# Patient Record
Sex: FEMALE | Race: WHITE | NOT HISPANIC OR LATINO | Employment: UNEMPLOYED | URBAN - METROPOLITAN AREA
[De-identification: names, ages, dates, MRNs, and addresses within clinical notes are randomized per-mention and may not be internally consistent; named-entity substitution may affect disease eponyms.]

---

## 2017-01-13 ENCOUNTER — ALLSCRIPTS OFFICE VISIT (OUTPATIENT)
Dept: OTHER | Facility: OTHER | Age: 52
End: 2017-01-13

## 2018-01-13 VITALS
HEART RATE: 70 BPM | HEIGHT: 63 IN | BODY MASS INDEX: 32.25 KG/M2 | SYSTOLIC BLOOD PRESSURE: 124 MMHG | DIASTOLIC BLOOD PRESSURE: 74 MMHG | WEIGHT: 182 LBS | RESPIRATION RATE: 16 BRPM

## 2018-02-14 DIAGNOSIS — G43.009 MIGRAINE WITHOUT AURA AND WITHOUT STATUS MIGRAINOSUS, NOT INTRACTABLE: Primary | ICD-10-CM

## 2018-02-14 RX ORDER — SUMATRIPTAN 100 MG/1
TABLET, FILM COATED ORAL
Qty: 9 TABLET | Refills: 5 | Status: SHIPPED | OUTPATIENT
Start: 2018-02-14 | End: 2019-03-15 | Stop reason: SDUPTHER

## 2018-02-14 RX ORDER — TOPIRAMATE 100 MG/1
TABLET, FILM COATED ORAL
Qty: 30 TABLET | Refills: 5 | Status: SHIPPED | OUTPATIENT
Start: 2018-02-14

## 2018-11-03 ENCOUNTER — HOSPITAL ENCOUNTER (EMERGENCY)
Facility: HOSPITAL | Age: 53
Discharge: HOME | End: 2018-11-04
Attending: EMERGENCY MEDICINE | Admitting: EMERGENCY MEDICINE
Payer: COMMERCIAL

## 2018-11-03 ENCOUNTER — APPOINTMENT (EMERGENCY)
Dept: RADIOLOGY | Facility: HOSPITAL | Age: 53
End: 2018-11-03
Payer: COMMERCIAL

## 2018-11-03 VITALS
OXYGEN SATURATION: 95 % | TEMPERATURE: 98.9 F | DIASTOLIC BLOOD PRESSURE: 67 MMHG | WEIGHT: 219.58 LBS | HEART RATE: 84 BPM | RESPIRATION RATE: 20 BRPM | SYSTOLIC BLOOD PRESSURE: 141 MMHG | BODY MASS INDEX: 38.9 KG/M2

## 2018-11-03 DIAGNOSIS — F41.9 ANXIETY: Primary | ICD-10-CM

## 2018-11-03 LAB
AMPHETAMINES SERPL QL SCN: NEGATIVE
ANION GAP SERPL CALCULATED.3IONS-SCNC: 9 MMOL/L (ref 4–13)
BARBITURATES UR QL: NEGATIVE
BASOPHILS # BLD AUTO: 0.04 THOUSANDS/ΜL (ref 0–0.1)
BASOPHILS NFR BLD AUTO: 0 % (ref 0–1)
BENZODIAZ UR QL: NEGATIVE
BUN SERPL-MCNC: 12 MG/DL (ref 5–25)
CALCIUM SERPL-MCNC: 9.4 MG/DL (ref 8.3–10.1)
CHLORIDE SERPL-SCNC: 106 MMOL/L (ref 100–108)
CO2 SERPL-SCNC: 26 MMOL/L (ref 21–32)
COCAINE UR QL: NEGATIVE
CREAT SERPL-MCNC: 0.7 MG/DL (ref 0.6–1.3)
EOSINOPHIL # BLD AUTO: 0.16 THOUSAND/ΜL (ref 0–0.61)
EOSINOPHIL NFR BLD AUTO: 2 % (ref 0–6)
ERYTHROCYTE [DISTWIDTH] IN BLOOD BY AUTOMATED COUNT: 11.9 % (ref 11.6–15.1)
ETHANOL EXG-MCNC: 0 MG/DL
GFR SERPL CREATININE-BSD FRML MDRD: 98 ML/MIN/1.73SQ M
GLUCOSE SERPL-MCNC: 110 MG/DL (ref 65–140)
HCT VFR BLD AUTO: 42.8 % (ref 34.8–46.1)
HGB BLD-MCNC: 14.3 G/DL (ref 11.5–15.4)
IMM GRANULOCYTES # BLD AUTO: 0.06 THOUSAND/UL (ref 0–0.2)
IMM GRANULOCYTES NFR BLD AUTO: 1 % (ref 0–2)
LYMPHOCYTES # BLD AUTO: 2.67 THOUSANDS/ΜL (ref 0.6–4.47)
LYMPHOCYTES NFR BLD AUTO: 27 % (ref 14–44)
MCH RBC QN AUTO: 33.2 PG (ref 26.8–34.3)
MCHC RBC AUTO-ENTMCNC: 33.4 G/DL (ref 31.4–37.4)
MCV RBC AUTO: 99 FL (ref 82–98)
METHADONE UR QL: NEGATIVE
MONOCYTES # BLD AUTO: 0.77 THOUSAND/ΜL (ref 0.17–1.22)
MONOCYTES NFR BLD AUTO: 8 % (ref 4–12)
NEUTROPHILS # BLD AUTO: 6.3 THOUSANDS/ΜL (ref 1.85–7.62)
NEUTS SEG NFR BLD AUTO: 62 % (ref 43–75)
NRBC BLD AUTO-RTO: 0 /100 WBCS
OPIATES UR QL SCN: NEGATIVE
PCP UR QL: NEGATIVE
PLATELET # BLD AUTO: 285 THOUSANDS/UL (ref 149–390)
PMV BLD AUTO: 10.3 FL (ref 8.9–12.7)
POTASSIUM SERPL-SCNC: 3.3 MMOL/L (ref 3.5–5.3)
RBC # BLD AUTO: 4.31 MILLION/UL (ref 3.81–5.12)
SODIUM SERPL-SCNC: 141 MMOL/L (ref 136–145)
THC UR QL: NEGATIVE
TROPONIN I SERPL-MCNC: <0.02 NG/ML
WBC # BLD AUTO: 10 THOUSAND/UL (ref 4.31–10.16)

## 2018-11-03 PROCEDURE — 85025 COMPLETE CBC W/AUTO DIFF WBC: CPT | Performed by: EMERGENCY MEDICINE

## 2018-11-03 PROCEDURE — 82075 ASSAY OF BREATH ETHANOL: CPT | Performed by: EMERGENCY MEDICINE

## 2018-11-03 PROCEDURE — 96374 THER/PROPH/DIAG INJ IV PUSH: CPT

## 2018-11-03 PROCEDURE — 71046 X-RAY EXAM CHEST 2 VIEWS: CPT

## 2018-11-03 PROCEDURE — 80048 BASIC METABOLIC PNL TOTAL CA: CPT | Performed by: EMERGENCY MEDICINE

## 2018-11-03 PROCEDURE — 36415 COLL VENOUS BLD VENIPUNCTURE: CPT | Performed by: EMERGENCY MEDICINE

## 2018-11-03 PROCEDURE — 99284 EMERGENCY DEPT VISIT MOD MDM: CPT

## 2018-11-03 PROCEDURE — 80307 DRUG TEST PRSMV CHEM ANLYZR: CPT | Performed by: EMERGENCY MEDICINE

## 2018-11-03 PROCEDURE — 84484 ASSAY OF TROPONIN QUANT: CPT | Performed by: EMERGENCY MEDICINE

## 2018-11-03 RX ORDER — 0.9 % SODIUM CHLORIDE 0.9 %
3 VIAL (ML) INJECTION AS NEEDED
Status: DISCONTINUED | OUTPATIENT
Start: 2018-11-03 | End: 2018-11-04 | Stop reason: HOSPADM

## 2018-11-03 RX ORDER — ALPRAZOLAM 0.5 MG/1
1 TABLET ORAL ONCE
Status: COMPLETED | OUTPATIENT
Start: 2018-11-03 | End: 2018-11-03

## 2018-11-03 RX ORDER — CLONAZEPAM 1 MG/1
1 TABLET ORAL 2 TIMES DAILY
COMMUNITY

## 2018-11-03 RX ORDER — LORAZEPAM 2 MG/ML
1 INJECTION INTRAMUSCULAR ONCE
Status: COMPLETED | OUTPATIENT
Start: 2018-11-03 | End: 2018-11-03

## 2018-11-03 RX ADMIN — ALPRAZOLAM 1 MG: 0.5 TABLET ORAL at 21:11

## 2018-11-03 RX ADMIN — LORAZEPAM 1 MG: 2 INJECTION INTRAMUSCULAR; INTRAVENOUS at 20:47

## 2018-11-04 NOTE — ED PROVIDER NOTES
History  Chief Complaint   Patient presents with    Anxiety     Pt states shes had anxiety for about an hour  She arrived by EMS  HPI   13-year-old female presents to the emergency department with anxiety and chest tightness  Patient is on willing to offer much history, but does admit to having been recently feeling very anxious  She takes Klonopin at home without significant relief  She denies any known trigger tonight for her anxiety  On review of systems, patient complains of chest tightness which she typically gets with anxiety  ROS otherwise negative  She denies any plan for suicide, though she does admit to having had passive thoughts of suicide for many years  She sees an outpatient therapist and does not believe she needs inpatient treatment at this time  Prior to Admission Medications   Prescriptions Last Dose Informant Patient Reported? Taking? SUMAtriptan (IMITREX) 100 mg tablet   No Yes   Sig: TAKE ONE TABLET BY MOUTH TWICE DAILY AS NEEDED   clonazePAM (KlonoPIN) 1 mg tablet   Yes Yes   Sig: Take 1 mg by mouth 2 (two) times a day   topiramate (TOPAMAX) 100 mg tablet   No Yes   Sig: TAKE ONE TABLET BY MOUTH ONCE DAILY AT BEDTIME      Facility-Administered Medications: None       Past Medical History:   Diagnosis Date    Psychiatric disorder        Past Surgical History:   Procedure Laterality Date     SECTION         History reviewed  No pertinent family history  I have reviewed and agree with the history as documented  Social History   Substance Use Topics    Smoking status: Light Tobacco Smoker    Smokeless tobacco: Never Used    Alcohol use No        Review of Systems   Constitutional: Negative for chills and fever  Respiratory: Negative for shortness of breath  Cardiovascular: Negative for chest pain (tightness only) and leg swelling  Gastrointestinal: Negative for abdominal pain, nausea and vomiting     Musculoskeletal: Negative for arthralgias and joint swelling  Skin: Negative for rash and wound  Allergic/Immunologic: Negative for immunocompromised state  Neurological: Negative for headaches  Psychiatric/Behavioral: The patient is nervous/anxious  All other systems reviewed and are negative  Physical Exam  Physical Exam   Constitutional: She is oriented to person, place, and time  She appears well-nourished  No distress  HENT:   Head: Normocephalic and atraumatic  Eyes: EOM are normal    Neck: Normal range of motion  Neck supple  Cardiovascular: Normal rate and regular rhythm  Pulmonary/Chest: Effort normal and breath sounds normal  No respiratory distress  Abdominal: Soft  She exhibits no distension  There is no tenderness  Musculoskeletal: Normal range of motion  Neurological: She is alert and oriented to person, place, and time  Skin: Skin is warm and dry  She is not diaphoretic  Psychiatric: Her mood appears anxious  She is withdrawn  She exhibits a depressed mood  She expresses no suicidal plans  Voice very soft, patient does not make eye contact on exam   Nursing note and vitals reviewed        Vital Signs  ED Triage Vitals [11/03/18 1852]   Temperature Pulse Respirations Blood Pressure SpO2   98 9 °F (37 2 °C) 96 20 (!) 176/82 100 %      Temp Source Heart Rate Source Patient Position - Orthostatic VS BP Location FiO2 (%)   Oral Monitor Lying Right arm --      Pain Score       --           Vitals:    11/03/18 1930 11/03/18 2000 11/03/18 2045 11/03/18 2245   BP: 155/78 140/68 164/79 141/67   Pulse: 96 85 55 84   Patient Position - Orthostatic VS:           Visual Acuity      ED Medications  Medications   LORazepam (ATIVAN) 2 mg/mL injection 1 mg (1 mg Intravenous Given 11/3/18 2047)   ALPRAZolam (XANAX) tablet 1 mg (1 mg Oral Given 11/3/18 2111)       Diagnostic Studies  Results Reviewed     Procedure Component Value Units Date/Time    POCT alcohol breath test [[id number]]  (Normal) Resulted:  11/03/18 2238    Lab Status: Final result Updated:  11/03/18 2238     EXTBreath Alcohol 0 00    Rapid drug screen, urine [[id number]]  (Normal) Collected:  11/03/18 2144    Lab Status:  Final result Specimen:  Urine from Urine, Clean Catch Updated:  11/03/18 2204     Amph/Meth UR Negative     Barbiturate Ur Negative     Benzodiazepine Urine Negative     Cocaine Urine Negative     Methadone Urine Negative     Opiate Urine Negative     PCP Ur Negative     THC Urine Negative    Narrative:         FOR MEDICAL PURPOSES ONLY  IF CONFIRMATION NEEDED PLEASE CONTACT THE LAB WITHIN 5 DAYS  Drug Screen Cutoff Levels:  AMPHETAMINE/METHAMPHETAMINES  1000 ng/mL  BARBITURATES     200 ng/mL  BENZODIAZEPINES     200 ng/mL  COCAINE      300 ng/mL  METHADONE      300 ng/mL  OPIATES      300 ng/mL  PHENCYCLIDINE     25 ng/mL  THC       50 ng/mL    Troponin I [[id number]]  (Normal) Collected:  11/03/18 2054    Lab Status:  Final result Specimen:  Blood from Arm, Left Updated:  11/03/18 2134     Troponin I <0 02 ng/mL     Basic metabolic panel [[id number]]  (Abnormal) Collected:  11/03/18 2054    Lab Status:  Final result Specimen:  Blood from Arm, Left Updated:  11/03/18 2125     Sodium 141 mmol/L      Potassium 3 3 (L) mmol/L      Chloride 106 mmol/L      CO2 26 mmol/L      ANION GAP 9 mmol/L      BUN 12 mg/dL      Creatinine 0 70 mg/dL      Glucose 110 mg/dL      Calcium 9 4 mg/dL      eGFR 98 ml/min/1 73sq m     Narrative:         National Kidney Disease Education Program recommendations are as follows:  GFR calculation is accurate only with a steady state creatinine  Chronic Kidney disease less than 60 ml/min/1 73 sq  meters  Kidney failure less than 15 ml/min/1 73 sq  meters      CBC and differential [[id number]]  (Abnormal) Collected:  11/03/18 2054    Lab Status:  Final result Specimen:  Blood from Arm, Left Updated:  11/03/18 2111     WBC 10 00 Thousand/uL      RBC 4 31 Million/uL      Hemoglobin 14 3 g/dL      Hematocrit 42 8 %      MCV 99 (H) fL      MCH 33 2 pg      MCHC 33 4 g/dL      RDW 11 9 %      MPV 10 3 fL      Platelets 515 Thousands/uL      nRBC 0 /100 WBCs      Neutrophils Relative 62 %      Immat GRANS % 1 %      Lymphocytes Relative 27 %      Monocytes Relative 8 %      Eosinophils Relative 2 %      Basophils Relative 0 %      Neutrophils Absolute 6 30 Thousands/µL      Immature Grans Absolute 0 06 Thousand/uL      Lymphocytes Absolute 2 67 Thousands/µL      Monocytes Absolute 0 77 Thousand/µL      Eosinophils Absolute 0 16 Thousand/µL      Basophils Absolute 0 04 Thousands/µL                  X-ray chest 2 views   Final Result by Tejinder Hitchcock MD (11/05 7093)      No acute cardiopulmonary disease  Workstation performed: [id number]                    Procedures  Procedures       Phone Contacts  ED Phone Contact    ED Course  ED Course as of Nov 10 0753   Sat Nov 03, 2018   2136 Medically cleared    2213 Pending crisis eval                                MDM  Number of Diagnoses or Management Options  Anxiety:   Diagnosis management comments: 28-year-old female with anxiety, chest tightness, likely related to anxiety  However, given patient's lack of history will obtain cardiac workup prior to crisis eval  Will give ativan and reassess  Dispo pending  No criteria for 302  CritCare Time    Disposition  Final diagnoses:   Anxiety     Time reflects when diagnosis was documented in both MDM as applicable and the Disposition within this note     Time User Action Codes Description Comment    11/3/2018 11:51 PM Kory Ray Add [F41 9] Anxiety       ED Disposition     ED Disposition Condition Comment    Discharge  Jona Carey discharge to home/self care      Condition at discharge: Good        MD Documentation      Most Recent Value   Sending MD Dr Kory Ray up With Specialties Details Why [facility] Emergency Medicine  As needed, If symptoms worsen [facility] [address]  [phone] [place] [facility], [address] [place] [address], [phone]          Discharge Medication List as of 11/3/2018 11:52 PM      CONTINUE these medications which have NOT CHANGED    Details   clonazePAM (KlonoPIN) 1 mg tablet Take 1 mg by mouth 2 (two) times a day, Historical Med      SUMAtriptan (IMITREX) 100 mg tablet TAKE ONE TABLET BY MOUTH TWICE DAILY AS NEEDED, Normal      topiramate (TOPAMAX) 100 mg tablet TAKE ONE TABLET BY MOUTH ONCE DAILY AT BEDTIME, Normal           No discharge procedures on file      ED Provider  Electronically Signed by           Wenceslao Rowe MD  11/10/18 0800

## 2018-11-04 NOTE — DISCHARGE INSTRUCTIONS
Anxiety   WHAT YOU NEED TO KNOW:   Anxiety is a condition that causes you to feel extremely worried or nervous  The feelings are so strong that they can cause problems with your daily activities or sleep  Anxiety may be triggered by something you fear, or it may happen without a cause  Family or work stress, smoking, caffeine, and alcohol can increase your risk for anxiety  Certain medicines or health conditions can also increase your risk  Anxiety can become a long-term condition if it is not managed or treated  DISCHARGE INSTRUCTIONS:   Call 911 if:   · You have chest pain, tightness, or heaviness that may spread to your shoulders, arms, jaw, neck, or back  · You feel like hurting yourself or someone else  Contact your healthcare provider if:   · Your symptoms get worse or do not get better with treatment  · Your anxiety keeps you from doing your regular daily activities  · You have new symptoms since your last visit  · You have questions or concerns about your condition or care  Medicines:   · Medicines  may be given to help you feel more calm and relaxed, and decrease your symptoms  · Take your medicine as directed  Contact your healthcare provider if you think your medicine is not helping or if you have side effects  Tell him of her if you are allergic to any medicine  Keep a list of the medicines, vitamins, and herbs you take  Include the amounts, and when and why you take them  Bring the list or the pill bottles to follow-up visits  Carry your medicine list with you in case of an emergency  Follow up with your healthcare provider within 2 weeks or as directed:  Write down your questions so you remember to ask them during your visits  Manage anxiety:   · Talk to someone about your anxiety  Your healthcare provider may suggest counseling  Cognitive behavioral therapy can help you understand and change how you react to events that trigger your symptoms   You might feel more comfortable talking with a friend or family member about your anxiety  Choose someone you know will be supportive and encouraging  · Find ways to relax  Activities such as exercise, meditation, or listening to music can help you relax  Spend time with friends, or do things you enjoy  · Practice deep breathing  Deep breathing can help you relax when you feel anxious  Focus on taking slow, deep breaths several times a day, or during an anxiety attack  Breathe in through your nose and out through your mouth  · Create a regular sleep routine  Regular sleep can help you feel calmer during the day  Go to sleep and wake up at the same times every day  Do not watch television or use the computer right before bed  Your room should be comfortable, dark, and quiet  · Eat a variety of healthy foods  Healthy foods include fruits, vegetables, low-fat dairy products, lean meats, fish, whole-grain breads, and cooked beans  Healthy foods can help you feel less anxious and have more energy  · Exercise regularly  Exercise can increase your energy level  Exercise may also lift your mood and help you sleep better  Your healthcare provider can help you create an exercise plan  · Do not smoke  Nicotine and other chemicals in cigarettes and cigars can increase anxiety  Ask your healthcare provider for information if you currently smoke and need help to quit  E-cigarettes or smokeless tobacco still contain nicotine  Talk to your healthcare provider before you use these products  · Do not have caffeine  Caffeine can make your symptoms worse  Do not have foods or drinks that are meant to increase your energy level  · Limit or do not drink alcohol  Ask your healthcare provider if alcohol is safe for you  You may not be able to drink alcohol if you take certain anxiety or depression medicines  Limit alcohol to 1 drink per day if you are a woman  Limit alcohol to 2 drinks per day if you are a man   A drink of alcohol is 12 ounces of beer, 5 ounces of wine, or 1½ ounces of liquor  · Do not use drugs  Drugs can make your anxiety worse  It can also make anxiety hard to manage  Talk to your healthcare provider if you use drugs and want help to quit  © 2017 [employer] [employer] Information is for End User's use only and may not be sold, redistributed or otherwise used for commercial purposes  All illustrations and images included in CareNotes® are the copyrighted property of A D A M , [employer]  or Ildefonso Chambers  The above information is an  only  It is not intended as medical advice for individual conditions or treatments  Talk to your doctor, nurse or pharmacist before following any medical regimen to see if it is safe and effective for you
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
## 2018-11-04 NOTE — ED NOTES
Upon giving pt d/c instructions pt asks to speak with crisis worker        Antonino Oropeza RN  11/04/18 0010

## 2019-03-15 DIAGNOSIS — G43.009 MIGRAINE WITHOUT AURA AND WITHOUT STATUS MIGRAINOSUS, NOT INTRACTABLE: ICD-10-CM

## 2019-03-18 RX ORDER — SUMATRIPTAN 100 MG/1
TABLET, FILM COATED ORAL
Qty: 9 TABLET | Refills: 5 | Status: SHIPPED | OUTPATIENT
Start: 2019-03-18 | End: 2020-12-08

## 2020-06-18 DIAGNOSIS — G43.009 MIGRAINE WITHOUT AURA AND WITHOUT STATUS MIGRAINOSUS, NOT INTRACTABLE: ICD-10-CM

## 2020-06-18 RX ORDER — SUMATRIPTAN 100 MG/1
TABLET, FILM COATED ORAL
Qty: 9 TABLET | Refills: 0 | OUTPATIENT
Start: 2020-06-18

## 2020-08-12 DIAGNOSIS — G43.009 MIGRAINE WITHOUT AURA AND WITHOUT STATUS MIGRAINOSUS, NOT INTRACTABLE: ICD-10-CM

## 2020-08-12 RX ORDER — SUMATRIPTAN 100 MG/1
TABLET, FILM COATED ORAL
Qty: 9 TABLET | Refills: 0 | OUTPATIENT
Start: 2020-08-12

## 2020-08-13 NOTE — TELEPHONE ENCOUNTER
Patient needs follow-up appointment for refills as per note below  Patient was last seeing 2017 needs a NP appointment  Will call patient to schedule an appointment    Left message for  patient to call back the office to schedule a NP patient appointment

## 2020-12-08 DIAGNOSIS — G43.009 MIGRAINE WITHOUT AURA AND WITHOUT STATUS MIGRAINOSUS, NOT INTRACTABLE: ICD-10-CM

## 2020-12-08 RX ORDER — SUMATRIPTAN 100 MG/1
TABLET, FILM COATED ORAL
Qty: 9 TABLET | Refills: 0 | Status: SHIPPED | OUTPATIENT
Start: 2020-12-08 | End: 2021-03-15

## 2021-03-13 DIAGNOSIS — G43.009 MIGRAINE WITHOUT AURA AND WITHOUT STATUS MIGRAINOSUS, NOT INTRACTABLE: ICD-10-CM

## 2021-03-15 RX ORDER — SUMATRIPTAN 100 MG/1
TABLET, FILM COATED ORAL
Qty: 9 TABLET | Refills: 0 | Status: SHIPPED | OUTPATIENT
Start: 2021-03-15

## 2021-03-15 NOTE — TELEPHONE ENCOUNTER
Patient hasn't been seen since 2017  Attempted to contact patient, however the voicemail box has not been set up yet  Unable to leave message

## 2021-10-23 ENCOUNTER — HOSPITAL ENCOUNTER (EMERGENCY)
Facility: HOSPITAL | Age: 56
Discharge: HOME | End: 2021-10-23
Attending: EMERGENCY MEDICINE | Admitting: EMERGENCY MEDICINE
Payer: COMMERCIAL

## 2021-10-23 ENCOUNTER — APPOINTMENT (EMERGENCY)
Dept: CT IMAGING | Facility: HOSPITAL | Age: 56
End: 2021-10-23
Payer: COMMERCIAL

## 2021-10-23 VITALS
BODY MASS INDEX: 32.6 KG/M2 | DIASTOLIC BLOOD PRESSURE: 74 MMHG | OXYGEN SATURATION: 98 % | WEIGHT: 184 LBS | TEMPERATURE: 98.3 F | RESPIRATION RATE: 17 BRPM | HEIGHT: 63 IN | HEART RATE: 64 BPM | SYSTOLIC BLOOD PRESSURE: 113 MMHG

## 2021-10-23 DIAGNOSIS — N20.0 KIDNEY STONE: Primary | ICD-10-CM

## 2021-10-23 LAB
ALBUMIN SERPL BCP-MCNC: 3.7 G/DL (ref 3.5–5)
ALP SERPL-CCNC: 139 U/L (ref 46–116)
ALT SERPL W P-5'-P-CCNC: 39 U/L (ref 12–78)
ANION GAP SERPL CALCULATED.3IONS-SCNC: 11 MMOL/L (ref 4–13)
AST SERPL W P-5'-P-CCNC: 27 U/L (ref 5–45)
BACTERIA UR QL AUTO: NORMAL /HPF
BASOPHILS # BLD AUTO: 0.05 THOUSANDS/ΜL (ref 0–0.1)
BASOPHILS NFR BLD AUTO: 1 % (ref 0–1)
BILIRUB SERPL-MCNC: 0.34 MG/DL (ref 0.2–1)
BILIRUB UR QL STRIP: NEGATIVE
BUN SERPL-MCNC: 13 MG/DL (ref 5–25)
CALCIUM SERPL-MCNC: 9 MG/DL (ref 8.3–10.1)
CHLORIDE SERPL-SCNC: 110 MMOL/L (ref 100–108)
CLARITY UR: CLEAR
CO2 SERPL-SCNC: 26 MMOL/L (ref 21–32)
COLOR UR: ABNORMAL
CREAT SERPL-MCNC: 0.83 MG/DL (ref 0.6–1.3)
EOSINOPHIL # BLD AUTO: 0.31 THOUSAND/ΜL (ref 0–0.61)
EOSINOPHIL NFR BLD AUTO: 4 % (ref 0–6)
ERYTHROCYTE [DISTWIDTH] IN BLOOD BY AUTOMATED COUNT: 11.8 % (ref 11.6–15.1)
GFR SERPL CREATININE-BSD FRML MDRD: 78 ML/MIN/1.73SQ M
GLUCOSE SERPL-MCNC: 100 MG/DL (ref 65–140)
GLUCOSE UR STRIP-MCNC: NEGATIVE MG/DL
HCG SERPL QL: NEGATIVE
HCT VFR BLD AUTO: 37.8 % (ref 34.8–46.1)
HGB BLD-MCNC: 12.9 G/DL (ref 11.5–15.4)
HGB UR QL STRIP.AUTO: ABNORMAL
IMM GRANULOCYTES # BLD AUTO: 0.05 THOUSAND/UL (ref 0–0.2)
IMM GRANULOCYTES NFR BLD AUTO: 1 % (ref 0–2)
KETONES UR STRIP-MCNC: NEGATIVE MG/DL
LEUKOCYTE ESTERASE UR QL STRIP: ABNORMAL
LIPASE SERPL-CCNC: 65 U/L (ref 73–393)
LYMPHOCYTES # BLD AUTO: 1.71 THOUSANDS/ΜL (ref 0.6–4.47)
LYMPHOCYTES NFR BLD AUTO: 23 % (ref 14–44)
MCH RBC QN AUTO: 33.3 PG (ref 26.8–34.3)
MCHC RBC AUTO-ENTMCNC: 34.1 G/DL (ref 31.4–37.4)
MCV RBC AUTO: 98 FL (ref 82–98)
MONOCYTES # BLD AUTO: 0.59 THOUSAND/ΜL (ref 0.17–1.22)
MONOCYTES NFR BLD AUTO: 8 % (ref 4–12)
NEUTROPHILS # BLD AUTO: 4.85 THOUSANDS/ΜL (ref 1.85–7.62)
NEUTS SEG NFR BLD AUTO: 63 % (ref 43–75)
NITRITE UR QL STRIP: NEGATIVE
NON-SQ EPI CELLS URNS QL MICRO: NORMAL /HPF
NRBC BLD AUTO-RTO: 0 /100 WBCS
PH UR STRIP.AUTO: 6 [PH]
PLATELET # BLD AUTO: 260 THOUSANDS/UL (ref 149–390)
PMV BLD AUTO: 11 FL (ref 8.9–12.7)
POTASSIUM SERPL-SCNC: 3.9 MMOL/L (ref 3.5–5.3)
PROT SERPL-MCNC: 7.5 G/DL (ref 6.4–8.2)
PROT UR STRIP-MCNC: NEGATIVE MG/DL
RBC # BLD AUTO: 3.87 MILLION/UL (ref 3.81–5.12)
RBC #/AREA URNS AUTO: NORMAL /HPF
SODIUM SERPL-SCNC: 147 MMOL/L (ref 136–145)
SP GR UR STRIP.AUTO: <=1.005 (ref 1–1.03)
UROBILINOGEN UR QL STRIP.AUTO: 0.2 E.U./DL
WBC # BLD AUTO: 7.56 THOUSAND/UL (ref 4.31–10.16)
WBC #/AREA URNS AUTO: NORMAL /HPF

## 2021-10-23 PROCEDURE — 81001 URINALYSIS AUTO W/SCOPE: CPT | Performed by: EMERGENCY MEDICINE

## 2021-10-23 PROCEDURE — 85025 COMPLETE CBC W/AUTO DIFF WBC: CPT | Performed by: EMERGENCY MEDICINE

## 2021-10-23 PROCEDURE — 83690 ASSAY OF LIPASE: CPT | Performed by: EMERGENCY MEDICINE

## 2021-10-23 PROCEDURE — G1004 CDSM NDSC: HCPCS

## 2021-10-23 PROCEDURE — 99284 EMERGENCY DEPT VISIT MOD MDM: CPT

## 2021-10-23 PROCEDURE — 84703 CHORIONIC GONADOTROPIN ASSAY: CPT | Performed by: EMERGENCY MEDICINE

## 2021-10-23 PROCEDURE — 99284 EMERGENCY DEPT VISIT MOD MDM: CPT | Performed by: EMERGENCY MEDICINE

## 2021-10-23 PROCEDURE — 96361 HYDRATE IV INFUSION ADD-ON: CPT

## 2021-10-23 PROCEDURE — 36415 COLL VENOUS BLD VENIPUNCTURE: CPT | Performed by: EMERGENCY MEDICINE

## 2021-10-23 PROCEDURE — 80053 COMPREHEN METABOLIC PANEL: CPT | Performed by: EMERGENCY MEDICINE

## 2021-10-23 PROCEDURE — 74177 CT ABD & PELVIS W/CONTRAST: CPT

## 2021-10-23 PROCEDURE — 96374 THER/PROPH/DIAG INJ IV PUSH: CPT

## 2021-10-23 PROCEDURE — 96375 TX/PRO/DX INJ NEW DRUG ADDON: CPT

## 2021-10-23 RX ORDER — MORPHINE SULFATE 4 MG/ML
4 INJECTION, SOLUTION INTRAMUSCULAR; INTRAVENOUS ONCE
Status: COMPLETED | OUTPATIENT
Start: 2021-10-23 | End: 2021-10-23

## 2021-10-23 RX ORDER — ONDANSETRON 2 MG/ML
4 INJECTION INTRAMUSCULAR; INTRAVENOUS ONCE
Status: COMPLETED | OUTPATIENT
Start: 2021-10-23 | End: 2021-10-23

## 2021-10-23 RX ORDER — KETOROLAC TROMETHAMINE 30 MG/ML
15 INJECTION, SOLUTION INTRAMUSCULAR; INTRAVENOUS ONCE
Status: COMPLETED | OUTPATIENT
Start: 2021-10-23 | End: 2021-10-23

## 2021-10-23 RX ADMIN — KETOROLAC TROMETHAMINE 15 MG: 30 INJECTION, SOLUTION INTRAMUSCULAR at 12:41

## 2021-10-23 RX ADMIN — IOHEXOL 100 ML: 350 INJECTION, SOLUTION INTRAVENOUS at 13:46

## 2021-10-23 RX ADMIN — MORPHINE SULFATE 4 MG: 4 INJECTION INTRAVENOUS at 14:38

## 2021-10-23 RX ADMIN — SODIUM CHLORIDE 1000 ML: 0.9 INJECTION, SOLUTION INTRAVENOUS at 12:41

## 2021-10-23 RX ADMIN — ONDANSETRON 4 MG: 2 INJECTION INTRAMUSCULAR; INTRAVENOUS at 12:41

## 2022-10-05 ENCOUNTER — HOSPITAL ENCOUNTER (EMERGENCY)
Facility: HOSPITAL | Age: 57
Discharge: HOME | End: 2022-10-05
Attending: EMERGENCY MEDICINE
Payer: COMMERCIAL

## 2022-10-05 ENCOUNTER — TELEPHONE (OUTPATIENT)
Dept: OBGYN CLINIC | Facility: HOSPITAL | Age: 57
End: 2022-10-05

## 2022-10-05 VITALS
RESPIRATION RATE: 18 BRPM | DIASTOLIC BLOOD PRESSURE: 68 MMHG | HEART RATE: 75 BPM | SYSTOLIC BLOOD PRESSURE: 137 MMHG | OXYGEN SATURATION: 98 % | TEMPERATURE: 96.9 F

## 2022-10-05 DIAGNOSIS — M25.569 KNEE PAIN: Primary | ICD-10-CM

## 2022-10-05 PROCEDURE — 99284 EMERGENCY DEPT VISIT MOD MDM: CPT | Performed by: EMERGENCY MEDICINE

## 2022-10-05 PROCEDURE — 99283 EMERGENCY DEPT VISIT LOW MDM: CPT

## 2022-10-05 RX ORDER — KETOROLAC TROMETHAMINE 10 MG/1
10 TABLET, FILM COATED ORAL ONCE
Status: COMPLETED | OUTPATIENT
Start: 2022-10-05 | End: 2022-10-05

## 2022-10-05 RX ORDER — PREDNISONE 20 MG/1
60 TABLET ORAL DAILY
Qty: 15 TABLET | Refills: 0 | Status: SHIPPED | OUTPATIENT
Start: 2022-10-05 | End: 2022-10-10

## 2022-10-05 RX ORDER — PREDNISONE 20 MG/1
60 TABLET ORAL ONCE
Status: COMPLETED | OUTPATIENT
Start: 2022-10-05 | End: 2022-10-05

## 2022-10-05 RX ORDER — KETOROLAC TROMETHAMINE 10 MG/1
10 TABLET, FILM COATED ORAL EVERY 6 HOURS PRN
Qty: 12 TABLET | Refills: 0 | Status: SHIPPED | OUTPATIENT
Start: 2022-10-05

## 2022-10-05 RX ADMIN — PREDNISONE 60 MG: 20 TABLET ORAL at 12:48

## 2022-10-05 RX ADMIN — KETOROLAC TROMETHAMINE 10 MG: 10 TABLET, FILM COATED ORAL at 12:48
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
## 2022-10-05 NOTE — TELEPHONE ENCOUNTER
Caller: Monet Richard    Doctor: [facility]    Reason for call: Patient wanted to know where a the closest [facility] was [place] [address] [place] [address]    Call back#: [phone]

## 2022-10-05 NOTE — DISCHARGE INSTRUCTIONS
Take Toradol every 8 hours as needed    Take prednisone, three tablets once daily for five days    Utilize the Voltaren topical on the area that is painful 3 times daily for five days

## 2022-10-14 ENCOUNTER — TELEPHONE (OUTPATIENT)
Dept: OBGYN CLINIC | Facility: CLINIC | Age: 57
End: 2022-10-14

## 2022-10-14 NOTE — TELEPHONE ENCOUNTER
Called patient to reschedule her 10/31/22 appointment due to doctor's schedule change  The patient will come in on 11/7/22 at 5:00

## 2022-11-02 ENCOUNTER — TELEPHONE (OUTPATIENT)
Dept: OBGYN CLINIC | Facility: CLINIC | Age: 57
End: 2022-11-02

## 2022-11-02 NOTE — TELEPHONE ENCOUNTER
Called pt pcp to request a P referral, the PCP will either fax and or place it into [address] [place] [address]

## 2023-10-25 ENCOUNTER — HOSPITAL ENCOUNTER (INPATIENT)
Facility: HOSPITAL | Age: 58
LOS: 3 days | Discharge: HOME | DRG: 422 | End: 2023-10-28
Attending: EMERGENCY MEDICINE | Admitting: ANESTHESIOLOGY
Payer: COMMERCIAL

## 2023-10-25 ENCOUNTER — APPOINTMENT (INPATIENT)
Dept: NON INVASIVE DIAGNOSTICS | Facility: HOSPITAL | Age: 58
DRG: 422 | End: 2023-10-25
Payer: COMMERCIAL

## 2023-10-25 ENCOUNTER — APPOINTMENT (EMERGENCY)
Dept: CT IMAGING | Facility: HOSPITAL | Age: 58
DRG: 422 | End: 2023-10-25
Payer: COMMERCIAL

## 2023-10-25 DIAGNOSIS — R42 DIZZINESS: Primary | ICD-10-CM

## 2023-10-25 DIAGNOSIS — R29.90 STROKE-LIKE SYMPTOMS: ICD-10-CM

## 2023-10-25 PROBLEM — F41.8 DEPRESSION WITH ANXIETY: Status: ACTIVE | Noted: 2023-10-25

## 2023-10-25 PROBLEM — J45.909 ASTHMA: Status: ACTIVE | Noted: 2023-10-25

## 2023-10-25 PROBLEM — G51.0 BELL'S PALSY: Status: ACTIVE | Noted: 2023-10-25

## 2023-10-25 PROBLEM — F32.A DEPRESSION: Status: ACTIVE | Noted: 2023-10-25

## 2023-10-25 PROBLEM — G43.909 MIGRAINES: Status: ACTIVE | Noted: 2023-10-25

## 2023-10-25 PROBLEM — F31.9 BIPOLAR DISORDER (HCC): Status: ACTIVE | Noted: 2023-10-25

## 2023-10-25 LAB
2HR DELTA HS TROPONIN: 2 NG/L
ANION GAP SERPL CALCULATED.3IONS-SCNC: 9 MMOL/L
AORTIC ROOT: 3.1 CM
APICAL FOUR CHAMBER EJECTION FRACTION: 66 %
APTT PPP: 30 SECONDS (ref 23–37)
ASCENDING AORTA: 2.8 CM
ATRIAL RATE: 71 BPM
BUN SERPL-MCNC: 17 MG/DL (ref 5–25)
CALCIUM SERPL-MCNC: 9.6 MG/DL (ref 8.4–10.2)
CARDIAC TROPONIN I PNL SERPL HS: 3 NG/L
CARDIAC TROPONIN I PNL SERPL HS: 5 NG/L
CHLORIDE SERPL-SCNC: 108 MMOL/L (ref 96–108)
CHOLEST SERPL-MCNC: 167 MG/DL
CO2 SERPL-SCNC: 24 MMOL/L (ref 21–32)
CREAT SERPL-MCNC: 0.8 MG/DL (ref 0.6–1.3)
E WAVE DECELERATION TIME: 199 MS
E/A RATIO: 0.82
ERYTHROCYTE [DISTWIDTH] IN BLOOD BY AUTOMATED COUNT: 11.9 % (ref 11.6–15.1)
EST. AVERAGE GLUCOSE BLD GHB EST-MCNC: 114 MG/DL
FLUAV RNA RESP QL NAA+PROBE: NEGATIVE
FLUBV RNA RESP QL NAA+PROBE: NEGATIVE
FRACTIONAL SHORTENING: 45 (ref 28–44)
GFR SERPL CREATININE-BSD FRML MDRD: 80 ML/MIN/1.73SQ M
GLUCOSE SERPL-MCNC: 102 MG/DL (ref 65–140)
HBA1C MFR BLD: 5.6 %
HCT VFR BLD AUTO: 39 % (ref 34.8–46.1)
HDLC SERPL-MCNC: 56 MG/DL
HGB BLD-MCNC: 13.2 G/DL (ref 11.5–15.4)
INR PPP: 0.98 (ref 0.84–1.19)
INTERVENTRICULAR SEPTUM IN DIASTOLE (PARASTERNAL SHORT AXIS VIEW): 1.1 CM
INTERVENTRICULAR SEPTUM: 1.1 CM (ref 0.6–1.1)
LAAS-AP2: 16.3 CM2
LAAS-AP4: 15.6 CM2
LDLC SERPL CALC-MCNC: 100 MG/DL (ref 0–100)
LEFT ATRIUM AREA SYSTOLE SINGLE PLANE A4C: 14.7 CM2
LEFT ATRIUM SIZE: 3.6 CM
LEFT ATRIUM VOLUME (MOD BIPLANE): 38 ML
LEFT ATRIUM VOLUME INDEX (MOD BIPLANE): 20.3 ML/M2
LEFT INTERNAL DIMENSION IN SYSTOLE: 2.6 CM (ref 2.1–4)
LEFT VENTRICULAR INTERNAL DIMENSION IN DIASTOLE: 4.7 CM (ref 3.5–6)
LEFT VENTRICULAR POSTERIOR WALL IN END DIASTOLE: 1 CM
LEFT VENTRICULAR STROKE VOLUME: 78 ML
LVSV (TEICH): 78 ML
MCH RBC QN AUTO: 33.7 PG (ref 26.8–34.3)
MCHC RBC AUTO-ENTMCNC: 33.8 G/DL (ref 31.4–37.4)
MCV RBC AUTO: 100 FL (ref 82–98)
MV E'TISSUE VEL-SEP: 8 CM/S
MV PEAK A VEL: 0.74 M/S
MV PEAK E VEL: 61 CM/S
MV STENOSIS PRESSURE HALF TIME: 58 MS
MV VALVE AREA P 1/2 METHOD: 3.79
P AXIS: 58 DEGREES
PLATELET # BLD AUTO: 286 THOUSANDS/UL (ref 149–390)
PMV BLD AUTO: 10.6 FL (ref 8.9–12.7)
POTASSIUM SERPL-SCNC: 4.6 MMOL/L (ref 3.5–5.3)
PR INTERVAL: 170 MS
PROTHROMBIN TIME: 13.6 SECONDS (ref 11.6–14.5)
QRS AXIS: 82 DEGREES
QRSD INTERVAL: 84 MS
QT INTERVAL: 408 MS
QTC INTERVAL: 443 MS
RBC # BLD AUTO: 3.92 MILLION/UL (ref 3.81–5.12)
RIGHT ATRIUM AREA SYSTOLE A4C: 13.2 CM2
RIGHT VENTRICLE ID DIMENSION: 2.7 CM
RSV RNA RESP QL NAA+PROBE: NEGATIVE
SARS-COV-2 RNA RESP QL NAA+PROBE: NEGATIVE
SL CV LEFT ATRIUM LENGTH A2C: 5.4 CM
SL CV LV EF: 55
SL CV PED ECHO LEFT VENTRICLE DIASTOLIC VOLUME (MOD BIPLANE) 2D: 102 ML
SL CV PED ECHO LEFT VENTRICLE SYSTOLIC VOLUME (MOD BIPLANE) 2D: 24 ML
SODIUM SERPL-SCNC: 141 MMOL/L (ref 135–147)
T WAVE AXIS: 27 DEGREES
TRICUSPID ANNULAR PLANE SYSTOLIC EXCURSION: 1.9 CM
TRIGL SERPL-MCNC: 53 MG/DL
VENTRICULAR RATE: 71 BPM
WBC # BLD AUTO: 8.33 THOUSAND/UL (ref 4.31–10.16)

## 2023-10-25 PROCEDURE — 84484 ASSAY OF TROPONIN QUANT: CPT | Performed by: EMERGENCY MEDICINE

## 2023-10-25 PROCEDURE — 85610 PROTHROMBIN TIME: CPT | Performed by: EMERGENCY MEDICINE

## 2023-10-25 PROCEDURE — 93306 TTE W/DOPPLER COMPLETE: CPT | Performed by: INTERNAL MEDICINE

## 2023-10-25 PROCEDURE — 96375 TX/PRO/DX INJ NEW DRUG ADDON: CPT

## 2023-10-25 PROCEDURE — 93306 TTE W/DOPPLER COMPLETE: CPT

## 2023-10-25 PROCEDURE — 94664 DEMO&/EVAL PT USE INHALER: CPT

## 2023-10-25 PROCEDURE — 85027 COMPLETE CBC AUTOMATED: CPT | Performed by: EMERGENCY MEDICINE

## 2023-10-25 PROCEDURE — 93005 ELECTROCARDIOGRAM TRACING: CPT

## 2023-10-25 PROCEDURE — 83036 HEMOGLOBIN GLYCOSYLATED A1C: CPT

## 2023-10-25 PROCEDURE — 92610 EVALUATE SWALLOWING FUNCTION: CPT

## 2023-10-25 PROCEDURE — 70498 CT ANGIOGRAPHY NECK: CPT

## 2023-10-25 PROCEDURE — 99223 1ST HOSP IP/OBS HIGH 75: CPT | Performed by: ANESTHESIOLOGY

## 2023-10-25 PROCEDURE — 80048 BASIC METABOLIC PNL TOTAL CA: CPT | Performed by: EMERGENCY MEDICINE

## 2023-10-25 PROCEDURE — 93010 ELECTROCARDIOGRAM REPORT: CPT | Performed by: INTERNAL MEDICINE

## 2023-10-25 PROCEDURE — 0241U HB NFCT DS VIR RESP RNA 4 TRGT: CPT | Performed by: EMERGENCY MEDICINE

## 2023-10-25 PROCEDURE — 99285 EMERGENCY DEPT VISIT HI MDM: CPT

## 2023-10-25 PROCEDURE — 70496 CT ANGIOGRAPHY HEAD: CPT

## 2023-10-25 PROCEDURE — 99291 CRITICAL CARE FIRST HOUR: CPT | Performed by: EMERGENCY MEDICINE

## 2023-10-25 PROCEDURE — 85730 THROMBOPLASTIN TIME PARTIAL: CPT | Performed by: EMERGENCY MEDICINE

## 2023-10-25 PROCEDURE — 36415 COLL VENOUS BLD VENIPUNCTURE: CPT | Performed by: EMERGENCY MEDICINE

## 2023-10-25 PROCEDURE — 94760 N-INVAS EAR/PLS OXIMETRY 1: CPT

## 2023-10-25 PROCEDURE — 3E03317 INTRODUCTION OF OTHER THROMBOLYTIC INTO PERIPHERAL VEIN, PERCUTANEOUS APPROACH: ICD-10-PCS | Performed by: EMERGENCY MEDICINE

## 2023-10-25 PROCEDURE — 80061 LIPID PANEL: CPT

## 2023-10-25 PROCEDURE — 96374 THER/PROPH/DIAG INJ IV PUSH: CPT

## 2023-10-25 RX ORDER — CHLORHEXIDINE GLUCONATE ORAL RINSE 1.2 MG/ML
15 SOLUTION DENTAL EVERY 12 HOURS SCHEDULED
Status: DISCONTINUED | OUTPATIENT
Start: 2023-10-25 | End: 2023-10-28 | Stop reason: HOSPADM

## 2023-10-25 RX ORDER — CYCLOBENZAPRINE HCL 10 MG
5 TABLET ORAL 3 TIMES DAILY PRN
Status: DISCONTINUED | OUTPATIENT
Start: 2023-10-25 | End: 2023-10-28 | Stop reason: HOSPADM

## 2023-10-25 RX ORDER — MECLIZINE HCL 12.5 MG/1
12.5 TABLET ORAL EVERY 8 HOURS SCHEDULED
Status: DISCONTINUED | OUTPATIENT
Start: 2023-10-25 | End: 2023-10-27

## 2023-10-25 RX ORDER — OLANZAPINE 5 MG/1
5 TABLET ORAL
COMMUNITY
Start: 2023-05-01

## 2023-10-25 RX ORDER — ALBUTEROL SULFATE 90 UG/1
1 AEROSOL, METERED RESPIRATORY (INHALATION) EVERY 6 HOURS PRN
COMMUNITY

## 2023-10-25 RX ORDER — OLANZAPINE 2.5 MG/1
5 TABLET ORAL
Status: DISCONTINUED | OUTPATIENT
Start: 2023-10-25 | End: 2023-10-28 | Stop reason: HOSPADM

## 2023-10-25 RX ORDER — ONDANSETRON 2 MG/ML
4 INJECTION INTRAMUSCULAR; INTRAVENOUS EVERY 4 HOURS PRN
Status: DISCONTINUED | OUTPATIENT
Start: 2023-10-25 | End: 2023-10-28 | Stop reason: HOSPADM

## 2023-10-25 RX ORDER — ATORVASTATIN CALCIUM 40 MG/1
40 TABLET, FILM COATED ORAL EVERY EVENING
Status: DISCONTINUED | OUTPATIENT
Start: 2023-10-25 | End: 2023-10-28 | Stop reason: HOSPADM

## 2023-10-25 RX ORDER — LAMOTRIGINE 200 MG/1
200 TABLET ORAL DAILY
COMMUNITY

## 2023-10-25 RX ORDER — ALBUTEROL SULFATE 90 UG/1
1 AEROSOL, METERED RESPIRATORY (INHALATION) EVERY 6 HOURS PRN
Status: DISCONTINUED | OUTPATIENT
Start: 2023-10-25 | End: 2023-10-28 | Stop reason: HOSPADM

## 2023-10-25 RX ORDER — LIDOCAINE 50 MG/G
2 PATCH TOPICAL DAILY
COMMUNITY
Start: 2023-08-08

## 2023-10-25 RX ORDER — LIDOCAINE 50 MG/G
2 PATCH TOPICAL DAILY
Status: DISCONTINUED | OUTPATIENT
Start: 2023-10-25 | End: 2023-10-28 | Stop reason: HOSPADM

## 2023-10-25 RX ORDER — ACETAMINOPHEN 325 MG/1
650 TABLET ORAL EVERY 4 HOURS PRN
Status: DISCONTINUED | OUTPATIENT
Start: 2023-10-25 | End: 2023-10-28 | Stop reason: HOSPADM

## 2023-10-25 RX ORDER — TOPIRAMATE 100 MG/1
100 TABLET, FILM COATED ORAL
Status: DISCONTINUED | OUTPATIENT
Start: 2023-10-25 | End: 2023-10-28 | Stop reason: HOSPADM

## 2023-10-25 RX ORDER — DIAZEPAM 5 MG/ML
5 INJECTION, SOLUTION INTRAMUSCULAR; INTRAVENOUS ONCE
Status: COMPLETED | OUTPATIENT
Start: 2023-10-25 | End: 2023-10-25

## 2023-10-25 RX ORDER — LAMOTRIGINE 100 MG/1
200 TABLET ORAL DAILY
Status: DISCONTINUED | OUTPATIENT
Start: 2023-10-25 | End: 2023-10-28 | Stop reason: HOSPADM

## 2023-10-25 RX ORDER — GABAPENTIN 300 MG/1
300 CAPSULE ORAL 3 TIMES DAILY
Status: ON HOLD | COMMUNITY
End: 2023-10-28 | Stop reason: SDUPTHER

## 2023-10-25 RX ORDER — ONDANSETRON 2 MG/ML
4 INJECTION INTRAMUSCULAR; INTRAVENOUS ONCE
Status: COMPLETED | OUTPATIENT
Start: 2023-10-25 | End: 2023-10-25

## 2023-10-25 RX ORDER — ACETAMINOPHEN 10 MG/ML
1000 INJECTION, SOLUTION INTRAVENOUS ONCE
Status: COMPLETED | OUTPATIENT
Start: 2023-10-25 | End: 2023-10-25

## 2023-10-25 RX ORDER — GABAPENTIN 300 MG/1
300 CAPSULE ORAL 3 TIMES DAILY
Status: DISCONTINUED | OUTPATIENT
Start: 2023-10-25 | End: 2023-10-28 | Stop reason: HOSPADM

## 2023-10-25 RX ORDER — CYCLOBENZAPRINE HCL 5 MG
5 TABLET ORAL 3 TIMES DAILY PRN
COMMUNITY
Start: 2023-08-08 | End: 2023-10-28

## 2023-10-25 RX ADMIN — ONDANSETRON 4 MG: 2 INJECTION INTRAMUSCULAR; INTRAVENOUS at 08:11

## 2023-10-25 RX ADMIN — CHLORHEXIDINE GLUCONATE 0.12% ORAL RINSE 15 ML: 1.2 LIQUID ORAL at 22:07

## 2023-10-25 RX ADMIN — LAMOTRIGINE 200 MG: 100 TABLET ORAL at 15:34

## 2023-10-25 RX ADMIN — ATORVASTATIN CALCIUM 40 MG: 40 TABLET, FILM COATED ORAL at 18:12

## 2023-10-25 RX ADMIN — MECLIZINE HYDROCHLORIDE 12.5 MG: 25 TABLET ORAL at 22:06

## 2023-10-25 RX ADMIN — OLANZAPINE 5 MG: 2.5 TABLET, FILM COATED ORAL at 22:06

## 2023-10-25 RX ADMIN — GABAPENTIN 300 MG: 300 CAPSULE ORAL at 22:06

## 2023-10-25 RX ADMIN — ACETAMINOPHEN 1000 MG: 10 INJECTION INTRAVENOUS at 12:58

## 2023-10-25 RX ADMIN — CYCLOBENZAPRINE HYDROCHLORIDE 5 MG: 10 TABLET, FILM COATED ORAL at 15:34

## 2023-10-25 RX ADMIN — MECLIZINE HYDROCHLORIDE 12.5 MG: 25 TABLET ORAL at 15:34

## 2023-10-25 RX ADMIN — IOHEXOL 85 ML: 350 INJECTION, SOLUTION INTRAVENOUS at 08:29

## 2023-10-25 RX ADMIN — Medication 21 MG: at 09:11

## 2023-10-25 RX ADMIN — TOPIRAMATE 100 MG: 100 TABLET, FILM COATED ORAL at 22:07

## 2023-10-25 RX ADMIN — ONDANSETRON 4 MG: 2 INJECTION INTRAMUSCULAR; INTRAVENOUS at 12:57

## 2023-10-25 RX ADMIN — DIAZEPAM 5 MG: 10 INJECTION, SOLUTION INTRAMUSCULAR; INTRAVENOUS at 08:43

## 2023-10-25 RX ADMIN — LIDOCAINE 2 PATCH: 50 PATCH CUTANEOUS at 13:04

## 2023-10-25 RX ADMIN — CHLORHEXIDINE GLUCONATE 0.12% ORAL RINSE 15 ML: 1.2 LIQUID ORAL at 13:07

## 2023-10-25 RX ADMIN — GABAPENTIN 300 MG: 300 CAPSULE ORAL at 15:34

## 2023-10-25 NOTE — ED NOTES
Pt had a period of intense dizziness that subsided in seconds, upon tnk admin dizziness was the same as dizziness now      Av Garg RN  10/25/23 8219 Clem Case RN  10/25/23 1008

## 2023-10-25 NOTE — RESPIRATORY THERAPY NOTE
RT Protocol Note  Omer Peters 59 y.o. female MRN: [id number]  Unit/Bed#:  Encounter: [id number]    Assessment    Principal Problem:    Dizziness  Active Problems:    Bell's palsy    Migraines    Asthma    Depression with anxiety    Bipolar disorder ([facility])      Home Pulmonary Medications:  inhaler       Past Medical History:   Diagnosis Date    Anxiety     Asthma     Bipolar 1 disorder ([address] )     Depression     Psychiatric disorder      Social History     Socioeconomic History    Marital status: Single     Spouse name: None    Number of children: None    Years of education: None    Highest education level: None   Occupational History    None   Tobacco Use    Smoking status: Never    Smokeless tobacco: Never   Vaping Use    Vaping Use: Every day    Substances: Flavoring   Substance and Sexual Activity    Alcohol use: Not Currently    Drug use: Not Currently    Sexual activity: None   Other Topics Concern    None   Social History Narrative    None     Social Determinants of Health     Financial Resource Strain: Not on file   Food Insecurity: Not on file   Transportation Needs: Not on file   Physical Activity: Not on file   Stress: Not on file   Social Connections: Not on file   Intimate Partner Violence: Not on file   Housing Stability: Not on file       Subjective         Objective    Physical Exam:   Assessment Type: (P) Assess only  General Appearance: (P) Awake, Alert  Respiratory Pattern: (P) Normal  Chest Assessment: (P) Chest expansion symmetrical  Bilateral Breath Sounds: (P) Diminished, Clear  Cough: (P) None  O2 Device: (P) RA    Vitals:  Blood pressure 130/62, pulse 75, temperature 98 °F (36.7 °C), temperature source Oral, resp. rate (P) 18, height 5' 3" (1.6 m), weight 83.5 kg (184 lb), SpO2 94 %. Imaging and other studies: I have personally reviewed pertinent reports.       O2 Device: (P) RA     Plan    Respiratory Plan: (P) Home Bronchodilator Patient pathway        Resp Comments: (P) Protocol completed. Pt w/ PMH of Asthma and has Albuterol inhaler at home. Will cont w/ PRN inhaler.

## 2023-10-25 NOTE — ED NOTES
Patient transported to [facility] Jerry Galaeno RN  10/25/23 2410

## 2023-10-25 NOTE — H&P
[address]  H&P  Name: Castillo Gillespie 59 y.o. female I MRN: [id number]  Unit/Bed#:  I Date of Admission: 10/25/2023   Date of Service: 10/25/2023 I Hospital Day: 0      Assessment/Plan   * Dizziness  Assessment & Plan  Patient reports acute onset dizziness at 0500 with report of "the room spinning"  In the ED patient was CVA alert due to concern for posterior circulation stroke  Hx bells palsy with chronic left sided weakness  NIHSS 2 due to left facial droop  CTA negative for LVO/significant stenosis, CTH negative  TNK given 0911  Follow stroke pathway  Consult to Neuro, PT, OT, ST, PMR  Avoid sedating medications during 24 hour post TNK period  Repeat CTH for acute change in neuro exam or headache    Bell's palsy  Assessment & Plan  History of with residual left sided facial weakness    Migraines  Assessment & Plan  Noted in history  Takes Topamax daily at HS and sumatriptan at home PRN    Bipolar disorder (HCC)  Assessment & Plan  Continue Lamictal  Hold zyprexa and Klonopin while in 24 hour post TNK window    Depression with anxiety  Assessment & Plan  Holding Klonopin PRN during 24 hour post TNK window    Asthma  Assessment & Plan  Albuterol PRN           History of Present Illness     HPI: Castillo Gillespie is a 59 y.o. with PMH bell's palsy with residual chronic left facial weakness, migraines,bipolar disorder and depression/anxiety who presents with acute onset of dizziness. Patient reports she woke up at 0400 this morning and felt in her usual state of health at that time. At about 0500 she had acute onset of vertigo with report of feeling like "the room was spinning." She called her daughter who called EMS. CTA was without evidence of LVO or significant stenosis. CTH without evidence of hemorrhage. Patient received TNK at 0911 due to concern for posterior circulation CVA.  Patient's NIHSS is 2 due to left facial droop, unclear if this is due to chronic left facial weakness or if possible CVA could be contributing. No extremity weakness noted. Patient is being admitted to the critical care department for post-TNK monitoring. History obtained from chart review and the patient. Review of Systems   Constitutional:  Negative for chills and fever. Respiratory: Negative. Cardiovascular: Negative. Musculoskeletal: Negative. Neurological:  Positive for dizziness ("the room is spinning") and facial asymmetry (hx bells palsy). Negative for syncope, speech difficulty, weakness, numbness and headaches. Historical Information   Past Medical History:  No date: Anxiety  No date: Asthma  No date: Bipolar 1 disorder (HCC)  No date: Depression  No date: Psychiatric disorder Past Surgical History:  No date:  SECTION   Current Outpatient Medications   Medication Instructions    albuterol (ProAir HFA) 90 mcg/act inhaler 1 puff, Inhalation, Every 6 hours PRN    clonazePAM (KLONOPIN) 1 mg, Oral, 2 times daily    cyclobenzaprine (FLEXERIL) 5 mg, Oral, 3 times daily PRN    Diclofenac Sodium (VOLTAREN) 2 g, Topical, 4 times daily    gabapentin (NEURONTIN) 300 mg, Oral, 3 times daily    ketorolac (TORADOL) 10 mg, Oral, Every 6 hours PRN    lamoTRIgine (LAMICTAL) 200 mg, Oral, Daily    lidocaine (LIDODERM) 5 % 2 patches, Topical, Daily    OLANZapine (ZYPREXA) 5 mg, Oral, Daily at bedtime    SUMAtriptan (IMITREX) 100 mg tablet Take 1 tablet by mouth twice daily as needed    topiramate (TOPAMAX) 100 mg tablet TAKE ONE TABLET BY MOUTH ONCE DAILY AT BEDTIME    No Known Allergies   Social History     Tobacco Use    Smoking status: Light Smoker    Smokeless tobacco: Never   Vaping Use    Vaping Use: Never used   Substance Use Topics    Alcohol use: No    Drug use: No    History reviewed. No pertinent family history.        Objective                            Vitals I/O      Most Recent Min/Max in 24hrs   Temp 98.2 °F (36.8 °C) Temp  Min: 98.2 °F (36.8 °C)  Max: 98.2 °F (36.8 °C)   Pulse 64 Pulse  Min: 59  Max: 78   Resp 20 Resp  Min: 13  Max: 34   /81 BP  Min: 135/70  Max: 177/84   O2 Sat 95 % SpO2  Min: 93 %  Max: 98 %    No intake or output data in the 24 hours ending 10/25/23 1228      Diet NPO     Invasive Monitoring Physical exam    Physical Exam  Eyes:      General: NeglectNo visual field deficit. Extraocular Movements:      Right eye: No nystagmus. Left eye: No nystagmus. Pupils: Pupils are equal, round, and reactive to light. HENT:      Head: Normocephalic and atraumatic. Mouth/Throat:      Mouth: Mucous membranes are moist.   Cardiovascular:      Rate and Rhythm: Normal rate and regular rhythm. Pulses: Normal pulses. Heart sounds: Normal heart sounds. Musculoskeletal:      Right lower leg: No edema. Left lower leg: No edema. Comments: Limited ROM on B/L LE related to prior back injury, no focal deficit   Abdominal:      Palpations: Abdomen is soft. Constitutional:       Appearance: She is normal weight. Pulmonary:      Effort: Pulmonary effort is normal.      Breath sounds: Normal breath sounds. Psychiatric:         Speech: Speech is not no receptive aphasia or no expressive aphasia. Neurological:      General: No focal deficit present. Mental Status: She is alert and oriented to person, place and time. Cranial Nerves: Facial asymmetry (left facial droop and eye weakness) present. No dysarthria. Sensory: Sensation is intact. Vitals reviewed. Diagnostic Studies      EKG: NSR  Imaging:  I have personally reviewed pertinent reports.        Medications:  Scheduled PRN   atorvastatin, 40 mg, QPM  chlorhexidine, 15 mL, Q12H [facility]  nicotine, 1 patch, Daily      influenza vaccine, 0.5 mL, Prior to discharge       Continuous          Labs:    CBC    Recent Labs     10/25/23  0804   WBC 8.33   HGB 13.2   HCT 39.0        BMP    Recent Labs     10/25/23  0804   SODIUM 141   K 4.6      CO2 24   AGAP 9   BUN 17 CREATININE 0.80   CALCIUM 9.6       Coags    Recent Labs     10/25/23  0804   INR 0.98   PTT 30        Additional Electrolytes  No recent results       Blood Gas    No recent results  No recent results LFTs  No recent results    Infectious  No recent results  Glucose  Recent Labs     10/25/23  0804   GLUC 102             Critical Care Time Delivered : Upon my evaluation, this patient had a high probability of imminent or life-threatening deterioration due to Dizziness s/p TNK administration, which required my direct attention, intervention, and personal management. I have personally provided 20 minutes of critical care time, exclusive of procedures, teaching, family meetings, and any prior time recorded by providers other than myself.    Anticipated Length of Stay is > [address], CRNP

## 2023-10-25 NOTE — SPEECH THERAPY NOTE
Speech-Language Pathology Bedside Swallow Evaluation        Patient Name: Neeru Cohen    RLMCF'A Date: 10/25/2023     Problem List  Principal Problem:    Dizziness  Active Problems:    Bell's palsy    Migraines    Asthma    Depression with anxiety    Bipolar disorder [facility])         Past Medical History  Past Medical History:   Diagnosis Date    Anxiety     Asthma     Bipolar 1 disorder ([address] [facility])     Depression     Psychiatric disorder        Past Surgical History  Past Surgical History:   Procedure Laterality Date     SECTION         Summary/Impressions:   Bedside observations support grossly intact oropharyngeal swallow function across all consistencies tested. Self-fed solid and liquid trials with no s/s of dysphagia or distress. Patient denies difficulties or changes from baseline function. Recommendations:   Diet: regular diet and thin liquids   Meds: whole with liquid   Feeding assistance: tray set up   Frequent Oral care: 2-4x/day  Aspiration precautions and compensatory swallowing strategies: upright posture, only feed when fully alert, slow rate of feeding, and small bites/sips  Other Recommendations/ considerations: No further ST follow-up; please reorder should pt status change or concerns arise. Current Medical Status  Pt is a 59 y.o. female who presented to [id number] [place] [address] with dizziness. S/p TNK; admitted to ICU under stroke pathway. +Facial droop; orders for dysphagia evaluation placed prior to PO intake. Past medical history:  Please see H&P for details    Special Studies:  10/25/23 CT stroke:  No acute intracranial abnormality. Chronic microangiopathic ischemic changes. 10/25/23 CT stroke alert:  1. CTA head: Negative for large vessel intracranial occlusion or hemodynamically significant stenosis. 2. CTA neck:  No extracranial carotid stenosis. The cervical vertebral arteries are patent.     Social/Education/Vocational Hx:  Pt lives with family    Swallow Information Current Risks for Dysphagia & Aspiration: CVA  Current Symptoms/Concerns:  facial weakness ; stroke pathway; s/p TNK  Current Diet: NPO   Baseline Diet: regular diet and thin liquids    Baseline Assessment   Behavior/Cognition: alert  Speech/Language Status: able to participate in basic conversation  Patient Positioning: upright in bed 45'    Swallow Mechanism Exam   Facial: left facial droop  Labial: decreased ROM left side - pt reports this is chronic; dx [place] Palsy x 9 years prior  Lingual: WFL  Velum: symmetrical  Mandible: adequate ROM  Dentition: adequate  Vocal quality:clear/adequate   Volitional Cough: strong/productive   Respiratory: RA    Consistencies Assessed and Performance   Consistencies Administered: thin liquids, puree, and hard solids    Oral Stage: WFL. Patient presents with adequate bolus acceptance, containment and manipulation. Mastication judged to be efficient and complete. No oral residue. Pharyngeal Stage: Appears functional.  Laryngeal rise noted upon palpation. Swallow initiation appears timely. No overt s/s of aspiration or distress. Vocal quality remains clear and dry. Esophageal Concerns: none reported     Plan  No additional follow-up at this time. Please re-order should pt exhibit change in status or concerns arise.      Results Reviewed with: Chapo Mirza, [address] [facility] [place], [address] [facility]  Speech-Language Pathologist  [place] #[id number]  [place] #[id number]

## 2023-10-25 NOTE — CONSULTS
Consultation - Stroke   Katina Shaw 59 y.o. female MRN: [id number]  Unit/Bed#: ED 13 Encounter: [id number]      Assessment/Plan   * Dizziness  Assessment & Plan  Katina Shaw is a 59 y.o. female with migraines, history of Bell's palsy residual left facial weakness, anxiety, depression, bipolar disorder who presents to [facility] ED on 10/25/2023 as a stroke alert with dizziness. Patient reports waking up at 4 AM with acute onset dizziness at 5 AM, provoked with head movements described as room spinning sensation. Minimal improvement in dizziness with IV Valium given in the ED.    - BP on presentation: 173/82  - NIHSS of 2 for chronic left lower facial weakness    Work-up:  - CT head: Unremarkable for acute intracranial abnormalities per discussion with attending neurologist, official read pending  - CTA head and neck: Unremarkable for large vessel occlusion/critical stenosis    Thrombolytic Decision: After a discussion of risks, benefits and alternatives (including best medical therapy excluding thrombolysis) reviewing inclusion and exclusion criteria the decision was made to proceed with thrombolytic therapy. Verbal consent was obtained from the patient. .  Consent was obtained by neurology advanced practitioner Jaycob Carreon PA-C in discussion with attending neurologist Dr. Skinny Kang. IV TNK was delayed as patient wanted some time to make the decision and IV Valium was also trialed during this time.     Plan:  Admit on stroke pathway  - Repeat CTH 24h post TNK  - Recommend MRI brain when stable   - Recommend Echo   - Recommend: Fasting lipid panel, Hemoglobin A1c, TSH  - Hold aspirin, anticoagulation, and DVT ppx 24h post TNK  - Continue Statin therapy   - Strict BP control <180/105  - Euglycemia, Normothermia   - Telemetry  - PT/OT/Speech   - Frequent neuro checks  - STAT CTH for acute change  - Medical management and supportive care per primary team, notify with changes      Recommendations for outpatient neurological follow up have yet to be determined. History of Present Illness     Reason for Consult / Principal Problem: Stroke alert, dizziness  Hx and PE limited by: N/A  Patient last known well: 0500 on 10/25/2023  Stroke alert called: 0801 on 10/25/2023  Neurology time of arrival: Attending neurologist responded immediately to the phone call  HPI: Kristy Barlow is a 59 y.o.  female with migraines, history of Bell's palsy residual left facial weakness, anxiety, depression, bipolar disorder who presents to [facility] ED on 10/25/2023 as a stroke alert with dizziness. Patient reports she woke up this morning at approximately 4 AM.  She stayed in bed until 5 AM when she sat up and developed acute onset dizziness. Patient states that she did not go to sleep after this. It is described as severe room spinning sensation, worsened with head movements. Patient states that she had difficulty getting out of bed and ambulating, requiring assistance from daughter. Patient denies any bilateral upper or bilateral lower extremity weakness, sensory deficits, difficulty with speech, slurring of her words or visual deficits. Patient presented to [facility] ED on 10/25/2023 as a stroke alert, BP elevated on presentation 173/82. CT head unremarkable for acute intracranial abnormalities. CTA head and neck unremarkable for large vessel occlusion or critical stenosis. NIHSS of 2 for chronic left lower facial weakness from history of [place] palsy. No evidence of ataxia in bilateral upper and lower extremities, no truncal ataxia, and no evidence of nystagmus. Unable to tolerate HINTS exam. Patient was within the time window for IV TNK, and patient was explained the risks and benefits of the thrombolytic. Patient denies any recent head trauma, history of brain bleed, history of stroke, history of GI bleeding, bleeding issues in the past, denies blood in urine or feces.     IV TNK was delayed as patient needed some time to make the decision. Received Zofran and Valium with minimal improvement in the dizziness and nausea. After further discussion with patient, decision was made to receive IV TNK. Case discussed with attending neurologist Dr. Severino Garcia. Prior to receiving IV TNK, patient did report a headache, similar to her migraines, associated with photophobia and phonophobia. Consult to Neurology  Consult performed by: Radha Acuna PA-C  Consult ordered by: Blaise Hardy MD        Review of Systems  12 point ROS limited to acuity of condition  Historical Information   Past Medical History:   Diagnosis Date    Anxiety     Asthma     Bipolar 1 disorder ([address])     Depression     Psychiatric disorder      Past Surgical History:   Procedure Laterality Date     SECTION       Social History   Social History     Substance and Sexual Activity   Alcohol Use No     Social History     Substance and Sexual Activity   Drug Use No     E-Cigarette/Vaping    E-Cigarette Use Never User      E-Cigarette/Vaping Substances     Social History     Tobacco Use   Smoking Status Light Smoker   Smokeless Tobacco Never     Family History: History reviewed. No pertinent family history. Review of previous medical records was completed. Meds/Allergies   all current active meds have been reviewed, current meds:   No current facility-administered medications for this encounter. , and PTA meds:   Prior to Admission Medications   Prescriptions Last Dose Informant Patient Reported? Taking?    Diclofenac Sodium (VOLTAREN) 1 % Not Taking  No No   Sig: Apply 2 g topically 4 (four) times a day   Patient not taking: Reported on 10/25/2023   SUMAtriptan (IMITREX) 100 mg tablet Past Month  No Yes   Sig: Take 1 tablet by mouth twice daily as needed   clonazePAM (KlonoPIN) 1 mg tablet Past Month  Yes Yes   Sig: Take 1 mg by mouth 2 (two) times a day   gabapentin (NEURONTIN) 300 mg capsule 10/24/2023  Yes Yes   Sig: Take 300 mg by mouth 3 (three) times a day ketorolac (TORADOL) 10 mg tablet Past Month  No Yes   Sig: Take 1 tablet (10 mg total) by mouth every 6 (six) hours as needed for moderate pain   lamoTRIgine (LaMICtal) 200 MG tablet 10/24/2023  Yes Yes   Sig: Take 200 mg by mouth daily   topiramate (TOPAMAX) 100 mg tablet Past Month  No Yes   Sig: TAKE ONE TABLET BY MOUTH ONCE DAILY AT BEDTIME      Facility-Administered Medications: None       No Known Allergies    Objective   Vitals:Blood pressure 144/70, pulse 67, temperature 98.2 °F (36.8 °C), resp. rate 19, weight 83.5 kg (184 lb), SpO2 95 %. ,Body mass index is 32.59 kg/m². No intake or output data in the 24 hours ending 10/25/23 0955    Invasive Devices: Invasive Devices       Peripheral Intravenous Line  Duration             Peripheral IV 10/25/23 Right Forearm <1 day                  Physical Exam  Vitals and nursing note reviewed. Constitutional:       General: She is not in acute distress. Appearance: Normal appearance. She is not ill-appearing, toxic-appearing or diaphoretic. HENT:      Head: Normocephalic and atraumatic. Eyes:      General: No scleral icterus. Right eye: No discharge. Left eye: No discharge. Extraocular Movements: Extraocular movements intact. Conjunctiva/sclera: Conjunctivae normal.   Musculoskeletal:      Cervical back: Normal range of motion and neck supple. Skin:     General: Skin is warm and dry. Coloration: Skin is not jaundiced or pale. Neurological:      Mental Status: She is alert. Psychiatric:         Mood and Affect: Mood normal.         Behavior: Behavior normal.         Thought Content: Thought content normal.         Judgment: Judgment normal.       Neurologic Exam     Mental Status   Patient is alert, lying in bed, accompanied by daughter. Oriented to person, place, month, and year. Able to name all objects provided. Able to follow central and appendicular commands and answers all questions appropriately.   No dysarthria or aphasia noted. Cranial Nerves   Primary gaze midline, conjugate gaze noted  No gaze preference or forced gaze deviation  EOMs intact, no evidence of horizontal or vertical nystagmus with eye movements  No visual field deficits noted  Left lower facial weakness noted, chronic     Motor Exam   Muscle bulk: normal  Overall muscle tone: normal  Able to elevate BUE off the bed and maintain antigravity without drift  Limited ROM in bilateral lower extremities, limited to chronic back pain  Able to elevate bilateral heels off the bed and maintain antigravity for approximately 5 seconds without drift. Sensory Exam   Sensation to light touch intact throughout  No evidence of extinction with bilateral simultaneous stimulation     Gait, Coordination, and Reflexes     Tremor   Resting tremor: absent  No obvious ataxia in BUE finger-nose testing, ROM slow bilaterally    No obvious ataxia in bilateral lower extremity heel-to-shin testing, ROM limited to chronic lower extremity weakness and chronic back pain    No involuntary movements or rhythmic seizure-like activity noted throughout exam     NIHSS:  1a.Level of Consciousness: 0 = Alert   1b. LOC Questions: 0 = Answers both correctly   1c. LOC Commands: 0 = Obeys both correctly   2. Best Gaze: 0 = Normal   3. Visual: 0 = No visual field loss   4. Facial Palsy: 2=Partial paralysis (total or near total paralysis of the lower face)   5a. Motor Right Arm: 0=No drift, limb holds 90 (or 45) degrees for full 10 seconds   5b. Motor Left Arm: 0=No drift, limb holds 90 (or 45) degrees for full 10 seconds   6a. Motor Right Le=No drift, limb holds 90 (or 45) degrees for full 10 seconds   6b. Motor Left Le=No drift, limb holds 90 (or 45) degrees for full 10 seconds   7. Limb Ataxia:  0=Absent   8. Sensory: 0=Normal; no sensory loss   9. Best Language:  0=No aphasia, normal   10. Dysarthria: 0=Normal articulation   11.  Extinction and Inattention (formerly Neglect): 0=No abnormality   Total Score: 2     Time NIHSS was completed: 0835 on 10/25/2023    Modified Hickman Score:  0 (No baseline symptoms/disability)    Lab Results: I have personally reviewed pertinent reports.   Recent Results (from the past 24 hour(s))   ECG 12 lead    Collection Time: 10/25/23  7:46 AM   Result Value Ref Range    Ventricular Rate 71 BPM    Atrial Rate 71 BPM    WV Interval 170 ms    QRSD Interval 84 ms    QT Interval 408 ms    QTC Interval 443 ms    P Stirling 58 degrees    QRS Axis 82 degrees    T Wave Axis 27 degrees   Basic metabolic panel    Collection Time: 10/25/23  8:04 AM   Result Value Ref Range    Sodium 141 135 - 147 mmol/L    Potassium 4.6 3.5 - 5.3 mmol/L    Chloride 108 96 - 108 mmol/L    CO2 24 21 - 32 mmol/L    ANION GAP 9 mmol/L    BUN 17 5 - 25 mg/dL    Creatinine 0.80 0.60 - 1.30 mg/dL    Glucose 102 65 - 140 mg/dL    Calcium 9.6 8.4 - 10.2 mg/dL    eGFR 80 ml/min/1.73sq m   CBC and Platelet    Collection Time: 10/25/23  8:04 AM   Result Value Ref Range    WBC 8.33 4.31 - 10.16 Thousand/uL    RBC 3.92 3.81 - 5.12 Million/uL    Hemoglobin 13.2 11.5 - 15.4 g/dL    Hematocrit 39.0 34.8 - 46.1 %     (H) 82 - 98 fL    MCH 33.7 26.8 - 34.3 pg    MCHC 33.8 31.4 - 37.4 g/dL    RDW 11.9 11.6 - 15.1 %    Platelets 963 508 - 802 Thousands/uL    MPV 10.6 8.9 - 12.7 fL   Protime-INR    Collection Time: 10/25/23  8:04 AM   Result Value Ref Range    Protime 13.6 11.6 - 14.5 seconds    INR 0.98 0.84 - 1.19   APTT    Collection Time: 10/25/23  8:04 AM   Result Value Ref Range    PTT 30 23 - 37 seconds   HS Troponin 0hr (reflex protocol)    Collection Time: 10/25/23  8:04 AM   Result Value Ref Range    hs TnI 0hr 3 "Refer to ACS Flowchart"- see link ng/L   FLU/RSV/COVID - if FLU/RSV clinically relevant    Collection Time: 10/25/23  8:10 AM    Specimen: Nose; Nares   Result Value Ref Range    SARS-CoV-2 Negative Negative    INFLUENZA A PCR Negative Negative    INFLUENZA B PCR Negative Negative    RSV PCR Negative Negative   ]  Imaging Studies: I have personally reviewed pertinent reports and I have personally reviewed pertinent films in PACS. EKG, Pathology, and Other Studies: I have personally reviewed pertinent reports. VTE Prophylaxis: Reason for no pharmacologic prophylaxis S/p IV TNK    Counseling / Coordination of Care  Total time spent today 66 minutes critical care time. Greater than 50% of total time was spent with the patient and/or family counseling and/or coordination of care. A description of the counseling/coordination of care:  Patient was seen and evaluated. Discussed with attending. Chart reviewed thoroughly including laboratory and imaging studies. Plan of care discussed with patient and primary team.  Discussed risk and benefits of IV TNK with patient and daughter at bedside. Discussed case with attending neurologist as well as decision to offer IV TNK to patient. Dictation voice to text software has been used in the creation of this document. Please consider this in light of any contextual or grammatical errors.

## 2023-10-25 NOTE — ED PROVIDER NOTES
History  Chief Complaint   Patient presents with    Dizziness     Pt woke up this morning with the room spinning. 61yo female with a history of migraines, asthma, and bipolar disorder presenting via EMS for evaluation of dizziness. She reports waking up this morning at 4am feeling normal. She had an abrupt onset of dizziness around 5am which she describes as feeling like the room is spinning. Symptoms are worse with head movement. She is also feeling nauseous but denies any vomiting. No prior history of similar symptoms. No prior history of stroke. Of note, she has a prior history of Bell's Palsy that was diagnosed 9 years ago and she has a chronic left facial droop. History provided by:  Patient   used: No        Prior to Admission Medications   Prescriptions Last Dose Informant Patient Reported? Taking? Diclofenac Sodium (VOLTAREN) 1 %   No No   Sig: Apply 2 g topically 4 (four) times a day   SUMAtriptan (IMITREX) 100 mg tablet   No No   Sig: Take 1 tablet by mouth twice daily as needed   clonazePAM (KlonoPIN) 1 mg tablet   Yes No   Sig: Take 1 mg by mouth 2 (two) times a day   ketorolac (TORADOL) 10 mg tablet   No No   Sig: Take 1 tablet (10 mg total) by mouth every 6 (six) hours as needed for moderate pain   topiramate (TOPAMAX) 100 mg tablet   No No   Sig: TAKE ONE TABLET BY MOUTH ONCE DAILY AT BEDTIME      Facility-Administered Medications: None       Past Medical History:   Diagnosis Date    Anxiety     Asthma     Bipolar 1 disorder ([address])     Depression     Psychiatric disorder        Past Surgical History:   Procedure Laterality Date     SECTION         History reviewed. No pertinent family history. I have reviewed and agree with the history as documented.     E-Cigarette/Vaping    E-Cigarette Use Never User      E-Cigarette/Vaping Substances     Social History     Tobacco Use    Smoking status: Light Smoker    Smokeless tobacco: Never   Vaping Use    Vaping Use: Never used   Substance Use Topics    Alcohol use: No    Drug use: No       Review of Systems   Gastrointestinal:  Positive for nausea. Neurological:  Positive for dizziness and facial asymmetry. Physical Exam  Physical Exam  Vitals and nursing note reviewed. Constitutional:       General: She is not in acute distress. Appearance: She is well-developed. She is not diaphoretic. HENT:      Head: Normocephalic and atraumatic. Right Ear: External ear normal.      Left Ear: External ear normal.      Nose: Nose normal.   Eyes:      General: No scleral icterus. Right eye: No discharge. Left eye: No discharge. Conjunctiva/sclera: Conjunctivae normal.      Pupils: Pupils are equal, round, and reactive to light. Cardiovascular:      Rate and Rhythm: Normal rate and regular rhythm. Heart sounds: Normal heart sounds. No murmur heard. Pulmonary:      Effort: Pulmonary effort is normal. No respiratory distress. Breath sounds: Normal breath sounds. No stridor. No wheezing or rales. Musculoskeletal:         General: No deformity. Normal range of motion. Cervical back: Normal range of motion and neck supple. Lymphadenopathy:      Cervical: No cervical adenopathy. Skin:     General: Skin is warm and dry. Neurological:      Mental Status: She is alert. She is not disoriented. GCS: GCS eye subscore is 4. GCS verbal subscore is 5. GCS motor subscore is 6. Comments: Exam significantly limited due to poor patient effort. +Chronic L facial droop. No obvious nystagmus. No obvious ataxia with finger to nose. Patient will not attempt heel to shin.     Psychiatric:         Behavior: Behavior normal.         Vital Signs  ED Triage Vitals   Temperature Pulse Respirations Blood Pressure SpO2   10/25/23 0743 10/25/23 0743 10/25/23 0743 10/25/23 0743 10/25/23 0743   98.2 °F (36.8 °C) 72 15 (!) 173/82 98 %      Temp src Heart Rate Source Patient Position - Orthostatic VS BP Location FiO2 (%)   -- -- -- -- --             Pain Score       10/25/23 0749       6           Vitals:    10/25/23 0831 10/25/23 0845 10/25/23 0900 10/25/23 0915   BP: 167/77 159/73 141/68 152/72   Pulse: 72 70 67 65         Visual Acuity  Visual Acuity      Flowsheet Row Most Recent Value   L Pupil Size (mm) 3   R Pupil Size (mm) 3            ED Medications  Medications   ondansetron (ZOFRAN) injection 4 mg (4 mg Intravenous Given 10/25/23 0811)   iohexol (OMNIPAQUE) 350 MG/ML injection (MULTI-DOSE) 85 mL (85 mL Intravenous Given 10/25/23 0829)   diazepam (VALIUM) injection 5 mg (5 mg Intravenous Given 10/25/23 0843)   tenecteplase (TNKase) injection 21 mg (21 mg Intravenous Given 10/25/23 0911)       Diagnostic Studies  Results Reviewed       Procedure Component Value Units Date/Time    Basic metabolic panel [[id number]] Collected: 10/25/23 0804    Lab Status: Final result Specimen: Blood from Arm, Right Updated: 10/25/23 0904     Sodium 141 mmol/L      Potassium 4.6 mmol/L      Chloride 108 mmol/L      CO2 24 mmol/L      ANION GAP 9 mmol/L      BUN 17 mg/dL      Creatinine 0.80 mg/dL      Glucose 102 mg/dL      Calcium 9.6 mg/dL      eGFR 80 ml/min/1.73sq m     Narrative:      [facility] for Chronic Kidney Disease (CKD):     Stage 1 with normal or high GFR (GFR > 90 mL/min/1.73 square meters)    Stage 2 Mild CKD (GFR = 60-89 mL/min/1.73 square meters)    Stage 3A Moderate CKD (GFR = 45-59 mL/min/1.73 square meters)    Stage 3B Moderate CKD (GFR = 30-44 mL/min/1.73 square meters)    Stage 4 Severe CKD (GFR = 15-29 mL/min/1.73 square meters)    Stage 5 End Stage CKD (GFR <15 mL/min/1.73 square meters)  Note: GFR calculation is accurate only with a steady state creatinine    FLU/RSV/COVID - if FLU/RSV clinically relevant [[id number]]  (Normal) Collected: 10/25/23 0810    Lab Status: Final result Specimen: Nares from Nose Updated: 10/25/23 0856     SARS-CoV-2 Negative     INFLUENZA A PCR Negative     INFLUENZA B PCR Negative     RSV PCR Negative    Narrative:      FOR PEDIATRIC PATIENTS - copy/paste COVID Guidelines URL to browser: [email or web address]    SARS-CoV-2 assay is a Nucleic Acid Amplification assay intended for the  qualitative detection of nucleic acid from SARS-CoV-2 in nasopharyngeal  swabs. Results are for the presumptive identification of SARS-CoV-2 RNA. Positive results are indicative of infection with SARS-CoV-2, the virus  causing COVID-19, but do not rule out bacterial infection or co-infection  with other viruses. Laboratories within the [facility] and its  territories are required to report all positive results to the appropriate  public health authorities. Negative results do not preclude SARS-CoV-2  infection and should not be used as the sole basis for treatment or other  patient management decisions. Negative results must be combined with  clinical observations, patient history, and epidemiological information. This test has not been FDA cleared or approved. This test has been authorized by FDA under an Emergency Use Authorization  (EUA). This test is only authorized for the duration of time the  declaration that circumstances exist justifying the authorization of the  emergency use of an in vitro diagnostic tests for detection of SARS-CoV-2  virus and/or diagnosis of COVID-19 infection under section 564(b)(1) of  the Act, 21 U. S.C. 538IVC-1(R)(9), unless the authorization is terminated  or revoked sooner. The test has been validated but independent review by FDA  and CLIA is pending. Test performed using [employer] Gene[employer]pert: This RT-PCR assay targets N2,  a region unique to SARS-CoV-2. A conserved region in the E-gene was chosen  for pan-Sarbecovirus detection which includes SARS-CoV-2. According to CMS-2020-01-R, this platform meets the definition of high-throughput technology.     HS Troponin I 2hr [[id number]]     Lab Status: No result Specimen: Blood     HS Troponin I 4hr [[id number]]     Lab Status: No result Specimen: Blood     HS Troponin 0hr (reflex protocol) [[id number]]  (Normal) Collected: 10/25/23 0804    Lab Status: Final result Specimen: Blood from Arm, Right Updated: 10/25/23 0840     hs TnI 0hr 3 ng/L     Protime-INR [[id number]]  (Normal) Collected: 10/25/23 0804    Lab Status: Final result Specimen: Blood from Arm, Right Updated: 10/25/23 0829     Protime 13.6 seconds      INR 0.98    APTT [[id number]]  (Normal) Collected: 10/25/23 0804    Lab Status: Final result Specimen: Blood from Arm, Right Updated: 10/25/23 0829     PTT 30 seconds     CBC and Platelet [[id number]]  (Abnormal) Collected: 10/25/23 0804    Lab Status: Final result Specimen: Blood from Arm, Right Updated: 10/25/23 0816     WBC 8.33 Thousand/uL      RBC 3.92 Million/uL      Hemoglobin 13.2 g/dL      Hematocrit 39.0 %       fL      MCH 33.7 pg      MCHC 33.8 g/dL      RDW 11.9 %      Platelets 454 Thousands/uL      MPV 10.6 fL                    CTA stroke alert (head/neck)   Final Result by Nelda Blake MD (10/25 0845)   1. CTA head: Negative for large vessel intracranial occlusion or hemodynamically significant stenosis. 2. CTA neck:  No extracranial carotid stenosis. The cervical vertebral arteries are patent. Findings were directly discussed with Dr. Jennifer Adkins at 8:45 a.m.                            Workstation performed: [id number]         CT stroke alert brain    (Results Pending)              Procedures  CriticalCare Time    Date/Time: 10/25/2023 9:33 AM    Performed by: Lesa Hardy PA-C  Authorized by: Lesa Hardy PA-C    Critical care provider statement:     Critical care time (minutes):  35    Critical care time was exclusive of:  Separately billable procedures and treating other patients    Critical care was necessary to treat or prevent imminent or life-threatening deterioration of the following conditions:  CNS failure or compromise    Critical care was time spent personally by me on the following activities:  Obtaining history from patient or surrogate, blood draw for specimens, ordering and performing treatments and interventions, ordering and review of laboratory studies, discussions with consultants, ordering and review of radiographic studies, discussions with primary provider, re-evaluation of patient's condition, evaluation of patient's response to treatment, examination of patient and review of old charts  ECG 12 Lead Documentation Only    Date/Time: 10/25/2023 3:06 PM    Performed by: Cheryle Moya PA-C  Authorized by: Cheryle Moya PA-C    Indications / Diagnosis:  Dizziness  ECG reviewed by me, the ED Provider: yes    Patient location:  ED  Rate:     ECG rate:  71    ECG rate assessment: normal    Rhythm:     Rhythm: sinus rhythm    Ectopy:     Ectopy: none    QRS:     QRS axis:  Normal  Conduction:     Conduction: normal    ST segments:     ST segments:  Normal  T waves:     T waves: non-specific             ED Course  ED Course as of 10/25/23 0931   Wed Oct 25, 2023   [facility] Neurology AP at bedside. [id number] Neurology consented patient to [facility]. ICU paged. Medical Decision Making  55yoF presenting for an acute onset of dizziness at 5am. Described as feeling like the room is spinning. Worse with head movement/position changes. Initial exam delayed due to a pediatric cardiac arrest occurring in the department while patient arrived. She was initially examined by Dr. Eboni Guzman and then I assumed care of patient was stroke alert was initiated. No obvious neuro deficit on exam other than facial asymmetry which is chronic from a prior Bell's Palsy although exam is extremely limited due to poor patient effort. Patient transported to CT scan and neurology AP evaluated patient promptly.     CT head is negative for bleed and no large vessel occlusion on CTA head/neck. There was no symptomatic improvement with IV Valium and IV Zofran and decision was made to given TNK. Consent obtained by neurology. Patient was admitted to the ICU for further management. Problems Addressed:  Dizziness: acute illness or injury    Amount and/or Complexity of Data Reviewed  Labs: ordered. Radiology: ordered. ECG/medicine tests: ordered and independent interpretation performed. Discussion of management or test interpretation with external provider(s): Neurology    Risk  Prescription drug management. Decision regarding hospitalization. Disposition  Final diagnoses:   Dizziness     Time reflects when diagnosis was documented in both MDM as applicable and the Disposition within this note       Time User Action Codes Description Comment    10/25/2023  7:57 AM Montanagary Hansford Add [R42] Dizziness           ED Disposition       ED Disposition   Admit    Condition   Stable    Date/Time   Wed Oct 25, 2023  9:30 AM    Comment   Case was discussed with Dr. Alessia Cannon and the patient's admission status was agreed to be Admission Status: inpatient status to the service of Dr. Alessia Cannon . Follow-up Information    None         Patient's Medications   Discharge Prescriptions    No medications on file       No discharge procedures on file.     PDMP Review       None            ED Provider  Electronically Signed by             Kay Chawla PA-C  10/26/23 8119

## 2023-10-25 NOTE — ED ATTENDING ATTESTATION
10/25/2023  I, Sheria Boas, MD, saw and evaluated the patient. I have discussed the patient with the resident/non-physician practitioner and agree with the resident's/non-physician practitioner's findings, Plan of Care, and MDM as documented in the resident's/non-physician practitioner's note, except where noted. All available labs and Radiology studies were reviewed. I was present for key portions of any procedure(s) performed by the resident/non-physician practitioner and I was immediately available to provide assistance. At this point I agree with the current assessment done in the Emergency Department. I have conducted an independent evaluation of this patient a history and physical is as follows:    ED Course     Patient was seen by me initially, arrived during a pediatric cardiac arrest.  When I was able to evaluate the patient she was currently on the commode and had some difficulty describing when the symptoms occurred. Initially felt the symptoms occurred on awakening. Patient reported severe dizziness especially with any type of movement. She denied any head trauma. She reported feeling like the room was spinning. Limited physical exam done due to acuity in the department she is awake and alert, she was able to ambulate from the commode to the bed with assistance. She had no focal weakness in her arms and legs. She had significant nystagmus on lateral gaze. She had marked worsening of symptoms with movement of her head. Patient had a chronic facial nerve palsy she reports for years. She had no upper extremity weakness. She had no lower extremity weakness. There is no ataxia finger-to-nose or heel-to-shin. Lungs were clear, heart regular rate and rhythm, no irregular heartbeat. Alert and oriented to person place and time. Because of the significant dizziness I was concerned about a posterior circulation stroke and made the patient a stroke alert.   Again evaluation was delayed due to the pediatric cardiac arrest also occurring in the department. Patient was also seen by advanced practitioner. Patient was seen by neurology while in the emergency department.     Critical Care Time  Procedures

## 2023-10-25 NOTE — ASSESSMENT & PLAN NOTE
Patient reports acute onset dizziness at 0500 with report of "the room spinning"  In the ED patient was CVA alert due to concern for posterior circulation stroke  Hx bells palsy with chronic left sided weakness  NIHSS 2 due to left facial droop  CTA negative for LVO/significant stenosis, CTH negative  TNK given 0911  Follow stroke pathway  Consult to Neuro, PT, OT, ST, PMR  Avoid sedating medications during 24 hour post TNK period  Repeat CTH for acute change in neuro exam or headache

## 2023-10-25 NOTE — ASSESSMENT & PLAN NOTE
Mary Bruner is a 59 y.o. female with migraines, history of Bell's palsy residual left facial weakness, anxiety, depression, bipolar disorder who presents to [facility] ED on 10/25/2023 as a stroke alert with dizziness. Patient reports waking up at 4 AM with acute onset dizziness at 5 AM, provoked with head movements described as room spinning sensation. BP on presentation to /82. Minimal improvement in dizziness with IV Valium given in the ED. NIHSS of 2 for chronic left lower facial weakness. CTH and CTA head and neck unremarkable for acute intracranial abnormalities or evidence of large vessel occlusion. After discussion of risks, benefits, and alternatives with patient and attending neurologist, decision was made to administer IV TNK which was given on 10/25/2023 at [phone]. Work-up:  - CT head: Unremarkable for acute intracranial abnormalities   - CTA head and neck: Unremarkable for large vessel occlusion/critical stenosis  - Echo: EF 55%, wall motion normal, bilateral atrium size WNL  - Lipid panel:  Total cholesterol 167, triglycerides 53, HDL 56,   - Hemoglobin A1c: 5.6    Plan:  Admit on stroke pathway  - Repeat CTH 24h post TNK pending   - MRI brain pending   - Continue to hold aspirin, anticoagulation, and DVT ppx 24h post TNK  - Continue Statin therapy   - Meclizine PRN  - Strict BP control <180/105  - Euglycemia, Normothermia   - Telemetry  - PT/OT/Speech   - Frequent neuro checks  - STAT CTH for acute change  - Medical management and supportive care per primary team, notify with changes

## 2023-10-26 ENCOUNTER — APPOINTMENT (INPATIENT)
Dept: MRI IMAGING | Facility: HOSPITAL | Age: 58
DRG: 422 | End: 2023-10-26
Payer: COMMERCIAL

## 2023-10-26 ENCOUNTER — APPOINTMENT (INPATIENT)
Dept: CT IMAGING | Facility: HOSPITAL | Age: 58
DRG: 422 | End: 2023-10-26
Payer: COMMERCIAL

## 2023-10-26 LAB
ANION GAP SERPL CALCULATED.3IONS-SCNC: 7 MMOL/L
BUN SERPL-MCNC: 18 MG/DL (ref 5–25)
CALCIUM SERPL-MCNC: 10 MG/DL (ref 8.4–10.2)
CHLORIDE SERPL-SCNC: 106 MMOL/L (ref 96–108)
CO2 SERPL-SCNC: 28 MMOL/L (ref 21–32)
CREAT SERPL-MCNC: 0.95 MG/DL (ref 0.6–1.3)
ERYTHROCYTE [DISTWIDTH] IN BLOOD BY AUTOMATED COUNT: 11.9 % (ref 11.6–15.1)
GFR SERPL CREATININE-BSD FRML MDRD: 65 ML/MIN/1.73SQ M
GLUCOSE SERPL-MCNC: 114 MG/DL (ref 65–140)
HCT VFR BLD AUTO: 40.3 % (ref 34.8–46.1)
HCV AB SER QL: NORMAL
HGB BLD-MCNC: 13.2 G/DL (ref 11.5–15.4)
MCH RBC QN AUTO: 33.6 PG (ref 26.8–34.3)
MCHC RBC AUTO-ENTMCNC: 32.8 G/DL (ref 31.4–37.4)
MCV RBC AUTO: 103 FL (ref 82–98)
PLATELET # BLD AUTO: 273 THOUSANDS/UL (ref 149–390)
PMV BLD AUTO: 10.4 FL (ref 8.9–12.7)
POTASSIUM SERPL-SCNC: 3.6 MMOL/L (ref 3.5–5.3)
RBC # BLD AUTO: 3.93 MILLION/UL (ref 3.81–5.12)
SODIUM SERPL-SCNC: 141 MMOL/L (ref 135–147)
WBC # BLD AUTO: 7.91 THOUSAND/UL (ref 4.31–10.16)

## 2023-10-26 PROCEDURE — 97163 PT EVAL HIGH COMPLEX 45 MIN: CPT

## 2023-10-26 PROCEDURE — 70450 CT HEAD/BRAIN W/O DYE: CPT

## 2023-10-26 PROCEDURE — 97166 OT EVAL MOD COMPLEX 45 MIN: CPT

## 2023-10-26 PROCEDURE — 70551 MRI BRAIN STEM W/O DYE: CPT

## 2023-10-26 PROCEDURE — 85027 COMPLETE CBC AUTOMATED: CPT | Performed by: PHYSICIAN ASSISTANT

## 2023-10-26 PROCEDURE — 99291 CRITICAL CARE FIRST HOUR: CPT

## 2023-10-26 PROCEDURE — G1004 CDSM NDSC: HCPCS

## 2023-10-26 PROCEDURE — 86803 HEPATITIS C AB TEST: CPT | Performed by: PHYSICIAN ASSISTANT

## 2023-10-26 PROCEDURE — 80048 BASIC METABOLIC PNL TOTAL CA: CPT | Performed by: PHYSICIAN ASSISTANT

## 2023-10-26 RX ORDER — HEPARIN SODIUM 5000 [USP'U]/ML
5000 INJECTION, SOLUTION INTRAVENOUS; SUBCUTANEOUS EVERY 8 HOURS SCHEDULED
Status: DISCONTINUED | OUTPATIENT
Start: 2023-10-26 | End: 2023-10-28 | Stop reason: HOSPADM

## 2023-10-26 RX ORDER — POTASSIUM CHLORIDE 20 MEQ/1
40 TABLET, EXTENDED RELEASE ORAL ONCE
Status: COMPLETED | OUTPATIENT
Start: 2023-10-26 | End: 2023-10-26

## 2023-10-26 RX ORDER — DIAZEPAM 5 MG/ML
5 INJECTION, SOLUTION INTRAMUSCULAR; INTRAVENOUS ONCE AS NEEDED
Status: COMPLETED | OUTPATIENT
Start: 2023-10-26 | End: 2023-10-26

## 2023-10-26 RX ORDER — DIAZEPAM 5 MG/ML
2.5 INJECTION, SOLUTION INTRAMUSCULAR; INTRAVENOUS ONCE AS NEEDED
Status: COMPLETED | OUTPATIENT
Start: 2023-10-26 | End: 2023-10-26

## 2023-10-26 RX ADMIN — DIAZEPAM 5 MG: 5 INJECTION INTRAMUSCULAR; INTRAVENOUS at 17:58

## 2023-10-26 RX ADMIN — GABAPENTIN 300 MG: 300 CAPSULE ORAL at 08:09

## 2023-10-26 RX ADMIN — LAMOTRIGINE 200 MG: 100 TABLET ORAL at 08:09

## 2023-10-26 RX ADMIN — LIDOCAINE 2 PATCH: 50 PATCH CUTANEOUS at 08:09

## 2023-10-26 RX ADMIN — GABAPENTIN 300 MG: 300 CAPSULE ORAL at 21:01

## 2023-10-26 RX ADMIN — HEPARIN SODIUM 5000 UNITS: 5000 INJECTION INTRAVENOUS; SUBCUTANEOUS at 21:06

## 2023-10-26 RX ADMIN — DIAZEPAM 2.5 MG: 5 INJECTION INTRAMUSCULAR; INTRAVENOUS at 17:21

## 2023-10-26 RX ADMIN — MECLIZINE HYDROCHLORIDE 12.5 MG: 25 TABLET ORAL at 21:02

## 2023-10-26 RX ADMIN — ATORVASTATIN CALCIUM 40 MG: 40 TABLET, FILM COATED ORAL at 17:21

## 2023-10-26 RX ADMIN — CHLORHEXIDINE GLUCONATE 0.12% ORAL RINSE 15 ML: 1.2 LIQUID ORAL at 21:01

## 2023-10-26 RX ADMIN — OLANZAPINE 5 MG: 2.5 TABLET, FILM COATED ORAL at 21:01

## 2023-10-26 RX ADMIN — CHLORHEXIDINE GLUCONATE 0.12% ORAL RINSE 15 ML: 1.2 LIQUID ORAL at 08:09

## 2023-10-26 RX ADMIN — MECLIZINE HYDROCHLORIDE 12.5 MG: 25 TABLET ORAL at 14:22

## 2023-10-26 RX ADMIN — POTASSIUM CHLORIDE 40 MEQ: 1500 TABLET, EXTENDED RELEASE ORAL at 12:34

## 2023-10-26 RX ADMIN — ASPIRIN 81 MG: 81 TABLET, COATED ORAL at 14:22

## 2023-10-26 RX ADMIN — HEPARIN SODIUM 5000 UNITS: 5000 INJECTION INTRAVENOUS; SUBCUTANEOUS at 17:21

## 2023-10-26 RX ADMIN — TOPIRAMATE 100 MG: 100 TABLET, FILM COATED ORAL at 21:02

## 2023-10-26 RX ADMIN — MECLIZINE HYDROCHLORIDE 12.5 MG: 25 TABLET ORAL at 05:07

## 2023-10-26 RX ADMIN — GABAPENTIN 300 MG: 300 CAPSULE ORAL at 17:21

## 2023-10-26 RX ADMIN — CYCLOBENZAPRINE HYDROCHLORIDE 5 MG: 10 TABLET, FILM COATED ORAL at 08:42

## 2023-10-26 NOTE — PLAN OF CARE
Problem: OCCUPATIONAL THERAPY ADULT  Goal: Performs self-care activities at highest level of function for planned discharge setting. See evaluation for individualized goals. Description: Treatment Interventions: ADL retraining, Functional transfer training, UE strengthening/ROM, Endurance training, Patient/family training, Compensatory technique education, Activityengagement          See flowsheet documentation for full assessment, interventions and recommendations. Note: Limitation: Decreased ADL status, Decreased UE strength, Decreased endurance, Decreased self-care trans, Decreased high-level ADLs, Decreased sensation  Prognosis: Good  Assessment: Patient is a 59 y.o. female seen for OT evaluation s/p admit to [facility]  on 10/25/2023 w/Dizziness. Commorbidities affecting patient's functional performance at time of assessment include: bell's palsy, migraines, asthma, depression/anxiety, and bipolar disorder. Orders placed for OT evaluation and treatment and out of bed to chair. Performed at least two patient identifiers during session including name and wristband. Prior to admission, Patient reporting being independent with ADLs/IADLs, ambulatory with no AD, and lives with family. Personal factors affecting patient at time of initial evaluation include: steps to enter, difficulty performing ADLs, and difficulty performing IADLs. Upon evaluation, patient requires supervision and minimal  assist for UB ADLs, minimal  and moderate assist for LB ADLs. Patient is oriented x 4. Occupational performance is affected by the following deficits: decreased muscle strength, dynamic sit/ stand balance deficit with poor standing tolerance time for self care and functional mobility, decreased activity tolerance, impaired sensation, and delayed righting and equilibrium reactions.  Patient to benefit from continued Occupational Therapy treatment while in the hospital to address deficits as defined above and maximize level of functional independence with ADLs and functional mobility. Occupational Performance areas to address include: bathing/ shower, dressing, toilet hygiene, transfer to all surfaces, functional ambulation, medication routine/ management, IADLS: Household maintenance, IADLs: safety procedures, and IADLs: meal prep/ clean up. From OT standpoint, recommendation at time of d/c would be Home with outpatient rehabilitation.      OT Discharge Recommendation: Home with outpatient rehabilitation (c family support)        Mike GOMEZ, OTR/L

## 2023-10-26 NOTE — PLAN OF CARE
Problem: Nutrition/Hydration-ADULT  Goal: Nutrient/Hydration intake appropriate for improving, restoring or maintaining nutritional needs  Description: Monitor and assess patient's nutrition/hydration status for malnutrition. Collaborate with interdisciplinary team and initiate plan and interventions as ordered. Monitor patient's weight and dietary intake as ordered or per policy. Utilize nutrition screening tool and intervene as necessary. Determine patient's food preferences and provide high-protein, high-caloric foods as appropriate. INTERVENTIONS:  - Monitor oral intake, urinary output, labs, and treatment plans  - Assess nutrition and hydration status and recommend course of action  - Evaluate amount of meals eaten  - Assist patient with eating if necessary   - Allow adequate time for meals  - Recommend/ encourage appropriate diets, oral nutritional supplements, and vitamin/mineral supplements  - Order, calculate, and assess calorie counts as needed  - Recommend, monitor, and adjust tube feedings and TPN/PPN based on assessed needs  - Assess need for intravenous fluids  - Provide specific nutrition/hydration education as appropriate  - Include patient/family/caregiver in decisions related to nutrition  Outcome: Progressing     Problem: Neurological Deficit  Goal: Neurological status is stable or improving  Description: Interventions:  - Monitor and assess patient's level of consciousness, motor function, sensory function, and level of assistance needed for ADLs. - Monitor and report changes from baseline. Collaborate with interdisciplinary team to initiate plan and implement interventions as ordered. - Provide and maintain a safe environment. - Consider seizure precautions. - Consider fall precautions. - Consider aspiration precautions. - Consider bleeding precautions. Outcome: Progressing     Problem:  Activity Intolerance/Impaired Mobility  Goal: Mobility/activity is maintained at optimum level for patient  Description: Interventions:  - Assess and monitor patient  barriers to mobility and need for assistive/adaptive devices. - Assess patient's emotional response to limitations. - Collaborate with interdisciplinary team and initiate plans and interventions as ordered. - Encourage independent activity per ability.  - Maintain proper body alignment. - Perform active/passive rom as tolerated/ordered. - Plan activities to conserve energy.  - Turn patient as appropriate  Outcome: Progressing     Problem: Communication Impairment  Goal: Ability to express needs and understand communication  Description: Assess patient's communication skills and ability to understand information. Patient will demonstrate use of effective communication techniques, alternative methods of communication and understanding even if not able to speak. - Encourage communication and provide alternate methods of communication as needed. - Collaborate with case management/ for discharge needs. - Include patient/family/caregiver in decisions related to communication. Outcome: Progressing     Problem: Potential for Aspiration  Goal: Non-ventilated patient's risk of aspiration is minimized  Description: Assess and monitor vital signs, respiratory status, and labs (WBC). Monitor for signs of aspiration (tachypnea, cough, rales, wheezing, cyanosis, fever). - Assess and monitor patient's ability to swallow. - Place patient up in chair to eat if possible. - HOB up at 90 degrees to eat if unable to get patient up into chair.  - Supervise patient during oral intake. - Instruct patient/ family to take small bites. - Instruct patient/ family to take small single sips when taking liquids.   - Follow patient-specific strategies generated by speech pathologist.  Outcome: Progressing     Problem: Nutrition  Goal: Nutrition/Hydration status is improving  Description: Monitor and assess patient's nutrition/hydration status for malnutrition (ex- brittle hair, bruises, dry skin, pale skin and conjunctiva, muscle wasting, smooth red tongue, and disorientation). Collaborate with interdisciplinary team and initiate plan and interventions as ordered. Monitor patient's weight and dietary intake as ordered or per policy. Utilize nutrition screening tool and intervene per policy. Determine patient's food preferences and provide high-protein, high-caloric foods as appropriate. - Assist patient with eating.  - Allow adequate time for meals.  - Encourage patient to take dietary supplement as ordered. - Collaborate with clinical nutritionist.  - Include patient/family/caregiver in decisions related to nutrition.   Outcome: Progressing     Problem: COPING  Goal: Pt/Family able to verbalize concerns and demonstrate effective coping strategies  Description: INTERVENTIONS:  - Assist patient/family to identify coping skills, available support systems and cultural and spiritual values  - Provide emotional support, including active listening and acknowledgement of concerns of patient and caregivers  - Reduce environmental stimuli, as able  - Provide patient education  - Assess for spiritual pain/suffering and initiate spiritual care, including notification of Pastoral Care or ronny based community as needed  - Assess effectiveness of coping strategies  Outcome: Progressing  Goal: Will report anxiety at manageable levels  Description: INTERVENTIONS:  - Administer medication as ordered  - Teach and encourage coping skills  - Provide emotional support  - Assess patient/family for anxiety and ability to cope  Outcome: Progressing     Problem: NEUROSENSORY - ADULT  Goal: Achieves stable or improved neurological status  Description: INTERVENTIONS  - Monitor and report changes in neurological status  - Monitor vital signs such as temperature, blood pressure, glucose, and any other labs ordered   - Initiate measures to prevent increased intracranial pressure  - Monitor for seizure activity and implement precautions if appropriate      Outcome: Progressing     Problem: PAIN - ADULT  Goal: Verbalizes/displays adequate comfort level or baseline comfort level  Description: Interventions:  - Encourage patient to monitor pain and request assistance  - Assess pain using appropriate pain scale  - Administer analgesics based on type and severity of pain and evaluate response  - Implement non-pharmacological measures as appropriate and evaluate response  - Consider cultural and social influences on pain and pain management  - Notify physician/advanced practitioner if interventions unsuccessful or patient reports new pain  Outcome: Progressing     Problem: INFECTION - ADULT  Goal: Absence or prevention of progression during hospitalization  Description: INTERVENTIONS:  - Assess and monitor for signs and symptoms of infection  - Monitor lab/diagnostic results  - Monitor all insertion sites, i.e. indwelling lines, tubes, and drains  - Monitor endotracheal if appropriate and nasal secretions for changes in amount and color  - [place] appropriate cooling/warming therapies per order  - Administer medications as ordered  - Instruct and encourage patient and family to use good hand hygiene technique  - Identify and instruct in appropriate isolation precautions for identified infection/condition  Outcome: Progressing     Problem: SAFETY ADULT  Goal: Patient will remain free of falls  Description: INTERVENTIONS:  - Educate patient/family on patient safety including physical limitations  - Instruct patient to call for assistance with activity   - Consult OT/PT to assist with strengthening/mobility   - Keep Call bell within reach  - Keep bed low and locked with side rails adjusted as appropriate  - Keep care items and personal belongings within reach  - Initiate and maintain comfort rounds  - Make Fall Risk Sign visible to staff  - Offer Toileting every 2 Hours, in advance of need  - Initiate/Maintain bed alarm  - Obtain necessary fall risk management equipment:   - Apply yellow socks and bracelet for high fall risk patients  - Consider moving patient to room near nurses station  Outcome: Progressing  Goal: Maintain or return to baseline ADL function  Description: INTERVENTIONS:  -  Assess patient's ability to carry out ADLs; assess patient's baseline for ADL function and identify physical deficits which impact ability to perform ADLs (bathing, care of mouth/teeth, toileting, grooming, dressing, etc.)  - Assess/evaluate cause of self-care deficits   - Assess range of motion  - Assess patient's mobility; develop plan if impaired  - Assess patient's need for assistive devices and provide as appropriate  - Encourage maximum independence but intervene and supervise when necessary  - Involve family in performance of ADLs  - Assess for home care needs following discharge   - Consider OT consult to assist with ADL evaluation and planning for discharge  - Provide patient education as appropriate  Outcome: Progressing  Goal: Maintains/Returns to pre admission functional level  Description: INTERVENTIONS:  - Perform BMAT or MOVE assessment daily.   - Set and communicate daily mobility goal to care team and patient/family/caregiver. - Collaborate with rehabilitation services on mobility goals if consulted  - Perform Range of Motion 3 times a day. - Reposition patient every 2 hours.   - Dangle patient 3 times a day  - Stand patient 3 times a day  - Ambulate patient 3 times a day  - Out of bed to chair 3 times a day   - Out of bed for meals 3 times a day  - Out of bed for toileting  - Record patient progress and toleration of activity level   Outcome: Progressing     Problem: DISCHARGE PLANNING  Goal: Discharge to home or other facility with appropriate resources  Description: INTERVENTIONS:  - Identify barriers to discharge w/patient and caregiver  - Arrange for needed discharge resources and transportation as appropriate  - Identify discharge learning needs (meds, wound care, etc.)  - Arrange for interpretive services to assist at discharge as needed  - Refer to Case Management Department for coordinating discharge planning if the patient needs post-hospital services based on physician/advanced practitioner order or complex needs related to functional status, cognitive ability, or social support system  Outcome: Progressing     Problem: Knowledge Deficit  Goal: Patient/family/caregiver demonstrates understanding of disease process, treatment plan, medications, and discharge instructions  Description: Complete learning assessment and assess knowledge base. Interventions:  - Provide teaching at level of understanding  - Provide teaching via preferred learning methods  Outcome: Progressing     Problem: MOBILITY - ADULT  Goal: Maintain or return to baseline ADL function  Description: INTERVENTIONS:  -  Assess patient's ability to carry out ADLs; assess patient's baseline for ADL function and identify physical deficits which impact ability to perform ADLs (bathing, care of mouth/teeth, toileting, grooming, dressing, etc.)  - Assess/evaluate cause of self-care deficits   - Assess range of motion  - Assess patient's mobility; develop plan if impaired  - Assess patient's need for assistive devices and provide as appropriate  - Encourage maximum independence but intervene and supervise when necessary  - Involve family in performance of ADLs  - Assess for home care needs following discharge   - Consider OT consult to assist with ADL evaluation and planning for discharge  - Provide patient education as appropriate  Outcome: Progressing  Goal: Maintains/Returns to pre admission functional level  Description: INTERVENTIONS:  - Perform BMAT or MOVE assessment daily.   - Set and communicate daily mobility goal to care team and patient/family/caregiver.    - Collaborate with rehabilitation services on mobility goals if consulted  - Perform Range of Motion 2 times a day. - Reposition patient every 2 hours.   - Dangle patient 3 times a day  - Stand patient 3 times a day  - Ambulate patient 3 times a day  - Out of bed to chair 3 times a day   - Out of bed for meals 3 times a day  - Out of bed for toileting  - Record patient progress and toleration of activity level   Outcome: Progressing     Problem: Prexisting or High Potential for Compromised Skin Integrity  Goal: Skin integrity is maintained or improved  Description: INTERVENTIONS:  - Identify patients at risk for skin breakdown  - Assess and monitor skin integrity  - Assess and monitor nutrition and hydration status  - Monitor labs   - Assess for incontinence   - Turn and reposition patient  - Assist with mobility/ambulation  - Relieve pressure over bony prominences  - Avoid friction and shearing  - Provide appropriate hygiene as needed including keeping skin clean and dry  - Evaluate need for skin moisturizer/barrier cream  - Collaborate with interdisciplinary team   - Patient/family teaching  - Consider wound care consult   Outcome: Progressing

## 2023-10-26 NOTE — PROGRESS NOTES
Progress Note - Neurology   Roxana Nagy 59 y.o. female MRN: [id number]  Unit/Bed#:  Encounter: [id number]      Assessment/Plan   * Dizziness  Assessment & Plan  Roxana Nagy is a 59 y.o. female with migraines, history of Bell's palsy residual left facial weakness, anxiety, depression, bipolar disorder who presents to [facility] ED on 10/25/2023 as a stroke alert with dizziness. Patient reports waking up at 4 AM with acute onset dizziness at 5 AM, provoked with head movements described as room spinning sensation. BP on presentation to /82. Minimal improvement in dizziness with IV Valium given in the ED. NIHSS of 2 for chronic left lower facial weakness. CTH and CTA head and neck unremarkable for acute intracranial abnormalities or evidence of large vessel occlusion. After discussion of risks, benefits, and alternatives with patient and attending neurologist, decision was made to administer IV TNK which was given on 10/25/2023 at [phone] [id number]. Work-up:  - CT head: Unremarkable for acute intracranial abnormalities   - CTA head and neck: Unremarkable for large vessel occlusion/critical stenosis  - Echo: EF 55%, wall motion normal, bilateral atrium size WNL  - Lipid panel: Total cholesterol 167, triglycerides 53, HDL 56,   - Hemoglobin A1c: 5.6    Plan:  Admit on stroke pathway  - Repeat CTH 24h post TNK pending   - MRI brain pending   - Continue to hold aspirin, anticoagulation, and DVT ppx 24h post TNK  - Continue Statin therapy   - Meclizine PRN  - Strict BP control <180/105  - Euglycemia, Normothermia   - Telemetry  - PT/OT/Speech   - Frequent neuro checks  - STAT CTH for acute change  - Medical management and supportive care per primary team, notify with changes    Recommendations for outpatient neurological follow up have yet to be determined. Subjective: Today patient reports that her dizziness is much better than yesterday, stating it is not as severe.   Patient reports that she is able to turn her head from side to side without any difficulty, stating it no longer provokes any worsening of her dizziness. She does report that the dizziness does get a bit worse when attempting to stand up and ambulate. Denies chest pain, shortness of breath, abdominal pain, nausea, vomiting, headache, new weakness, numbness/tingling. ROS:  12 point ROS performed, as stated above, all others negative. Vitals: Blood pressure 93/50, pulse 58, temperature 97.5 °F (36.4 °C), temperature source Oral, resp. rate 13, height 5' 3" (1.6 m), weight 84.3 kg (185 lb 13.6 oz), SpO2 91 %. ,Body mass index is 32.92 kg/m². Physical Exam  Vitals and nursing note reviewed. Constitutional:       General: She is not in acute distress. Appearance: Normal appearance. She is not ill-appearing, toxic-appearing or diaphoretic. HENT:      Head: Normocephalic and atraumatic. Eyes:      General: No scleral icterus. Right eye: No discharge. Left eye: No discharge. Extraocular Movements: Extraocular movements intact and EOM normal.      Conjunctiva/sclera: Conjunctivae normal.      Pupils: Pupils are equal, round, and reactive to light. Musculoskeletal:         General: Normal range of motion. Skin:     General: Skin is warm and dry. Coloration: Skin is not jaundiced or pale. Neurological:      Mental Status: She is alert. Psychiatric:         Mood and Affect: Mood normal.         Behavior: Behavior normal.         Thought Content: Thought content normal.         Judgment: Judgment normal.       Neurologic Exam     Mental Status   Patient is alert, lying in bed  Oriented to person, place, month, and year   Follows central and appendicular commands   Answers all questions appropriately   No dysarthria or aphasia noted     Cranial Nerves     CN II   Visual fields full to confrontation. CN III, IV, VI   Pupils are equal, round, and reactive to light.   Extraocular motions are normal.   Nystagmus: none   Upgaze: normal  Downgaze: normal  Conjugate gaze: present    CN V   Facial sensation intact. CN VIII   Hearing: intact    CN XI   CN XI normal.     CN XII   Tongue deviation: none  Difficulty with opening eyes completely, requiring assistance  Also noted to have left lower facial weakness, chronic      Motor Exam   Muscle bulk: normal  Overall muscle tone: normal  No pronation or drift in bilateral upper extremities  Bilateral  4+/5, effort limited  Bilateral biceps, triceps, and deltoid strength 5/5 throughout  Bilateral hip flexion, plantarflexion, and dorsiflexion strength 5/5     Sensory Exam   Light touch normal.     Gait, Coordination, and Reflexes     Tremor   Resting tremor: absent  No ataxia or dysmetria in bilateral upper extremity finger-nose testing    No involuntary movements or rhythmic seizure-like activity noted throughout exam     Lab Results: I have personally reviewed pertinent reports.   Recent Results (from the past 24 hour(s))   Echo complete w/ contrast if indicated    Collection Time: 10/25/23  2:40 PM   Result Value Ref Range    RAA A4C 13.2 cm2    MARIA DOLORES A4C 14.7 cm2    LA Volume Index (BP) 20.3 mL/m2    MV Peak A Tushar 0.74 m/s    MV stenosis pressure 1/2 time 58 ms    MV Peak E Tushar 61 cm/s    E wave deceleration time 199 ms    E/A ratio 0.82     MV valve area p 1/2 method 3.79     RVID d 2.7 cm    A4C EF 66 %    Left ventricular stroke volume (2D) 78.00 mL    IVSd 1.10 cm    Tricuspid annular plane systolic excursion 3.19 cm    Ao root 3.10 cm    LVPWd 1.00 cm    LA size 3.6 cm    Asc Ao 2.8 cm    LA volume (BP) 38 mL    FS 45 28 - 44    LVIDS 2.60 cm    IVS 1.1 cm    LVIDd 4.70 cm    LA length (A2C) 5.40 cm    LEFT VENTRICLE SYSTOLIC VOLUME (MOD BIPLANE) 2D 24 mL    LV DIASTOLIC VOLUME (MOD BIPLANE) 2D 102 mL    Left Atrium Area-systolic Four Chamber 48.9 cm2    Left Atrium Area-systolic Apical Two Chamber 16.3 cm2    MV E' Tissue Velocity Septal 8 cm/s    LVSV, 2D 78 mL    LV EF 55    Basic metabolic panel    Collection Time: 10/26/23  9:32 AM   Result Value Ref Range    Sodium 141 135 - 147 mmol/L    Potassium 3.6 3.5 - 5.3 mmol/L    Chloride 106 96 - 108 mmol/L    CO2 28 21 - 32 mmol/L    ANION GAP 7 mmol/L    BUN 18 5 - 25 mg/dL    Creatinine 0.95 0.60 - 1.30 mg/dL    Glucose 114 65 - 140 mg/dL    Calcium 10.0 8.4 - 10.2 mg/dL    eGFR 65 ml/min/1.73sq m   CBC and Platelet    Collection Time: 10/26/23  9:32 AM   Result Value Ref Range    WBC 7.91 4.31 - 10.16 Thousand/uL    RBC 3.93 3.81 - 5.12 Million/uL    Hemoglobin 13.2 11.5 - 15.4 g/dL    Hematocrit 40.3 34.8 - 46.1 %     (H) 82 - 98 fL    MCH 33.6 26.8 - 34.3 pg    MCHC 32.8 31.4 - 37.4 g/dL    RDW 11.9 11.6 - 15.1 %    Platelets 784 186 - 346 Thousands/uL    MPV 10.4 8.9 - 12.7 fL   ]  Imaging Studies: I have personally reviewed pertinent reports and I have personally reviewed pertinent films in PACS. EKG, Pathology, and Other Studies: I have personally reviewed pertinent reports. VTE Prophylaxis: Reason for no pharmacologic prophylaxis s/p IV TNK on 10/25/2023    Counseling / Coordination of Care  Total time spent today 28 minutes. Greater than 50% of total time was spent with the patient and/or family counseling and/or coordination of care. A description of the counseling/coordination of care:  Patient was seen and evaluated. Discussed with attending. Chart reviewed thoroughly including laboratory and imaging studies. Plan of care discussed with patient and primary team. Discussed plan to obtain repeat CTH as well as MRI brain with the patient. Dictation voice to text software has been used in the creation of this document. Please consider this in light of any contextual or grammatical errors.

## 2023-10-26 NOTE — PLAN OF CARE
Problem: PHYSICAL THERAPY ADULT  Goal: Performs mobility at highest level of function for planned discharge setting. See evaluation for individualized goals. Description: Treatment/Interventions: Functional transfer training, Elevations, Endurance training, Patient/family training, Equipment eval/education, Bed mobility, Gait training, Spoke to nursing, OT (higher level balance, ascertain vestibular needs/deficits)  Equipment Recommended:  (TBD)       See flowsheet documentation for full assessment, interventions and recommendations. 10/26/2023 1336 by Tom Goldsmith PT  Note: Prognosis: Good  Problem List: Decreased endurance, Impaired balance, Decreased mobility, Pain  Assessment: Pt is 59 y.o. female seen for PT evaluation on 10/26/2023 s/p admit to [address] on 10/25/2023 w/ Dizziness. PT was consulted to assess pt's functional mobility and d/c needs. Order placed for PT eval and tx. PTA, pt resides with family in [facility] with FOS to enter, ambulates without AD at baseline, +fall history. At time of eval, pt requiring mod A for supine to sit transfer, min A for sit to supine transfer; further mobility deferred by pt at this time 2* significant dizziness. Upon evaluation, pt presenting with impaired functional mobility d/t decreased endurance, impaired balance, decreased mobility, pain, and activity intolerance. Pertinent PMHx and current co-morbidities affecting pt's physical performance at time of assessment include: dizziness, Bell's Palsy, migraines, asthma, depression with anxiety, bipolar disorder. Personal factors affecting pt at time of eval include: lives in 2 story house, stairs to enter home, and positive fall history. The following objective measures performed on IE also reveal limitations: Barthel Index: 55/100, Modified Xochilt: 4 (moderate/severe disability), and AM-PAC 6-Clicks: 93/69.  Pt's clinical presentation is currently unstable/unpredictable seen in pt's presentation of abnormal lab value(s), ongoing medical assessment, on telemetry monitoring, and pending brain MRI to r/o CVA . Overall, pt's rehab potential and prognosis to return to PLOF is good as impacted by objective findings, warranting pt to receive further skilled PT interventions to address identified impairments, activity limitation(s), and participation restriction(s). Pt to benefit from continued PT tx to address deficits as defined above and maximize level of functional independent mobility. From PT/mobility standpoint, recommendation at time of d/c would be hopeful home with outpatient rehabilitation pending progress in order to facilitate return to PLOF. Barriers to Discharge: Inaccessible home environment     PT Discharge Recommendation: Home with outpatient rehabilitation (hopeful for OPPT therapy (vestibular emphasis?) pending progress and further medical workup. vs STR)    See flowsheet documentation for full assessment. 10/26/2023 1336 by Prince De PT  Note: Prognosis: Good  Problem List: Decreased endurance, Impaired balance, Decreased mobility, Pain  Assessment: Pt is 59 y.o. female seen for PT evaluation on 10/26/2023 s/p admit to [address] [place] on 10/25/2023 w/ Dizziness. PT was consulted to assess pt's functional mobility and d/c needs. Order placed for PT eval and tx. PTA, pt resides with family in [facility] with FOS to enter, ambulates without AD at baseline, +fall history. At time of eval, pt requiring mod A for supine to sit transfer, min A for sit to supine transfer; further mobility deferred by pt at this time 2* significant dizziness. Upon evaluation, pt presenting with impaired functional mobility d/t decreased endurance, impaired balance, decreased mobility, pain, and activity intolerance. Pertinent PMHx and current co-morbidities affecting pt's physical performance at time of assessment include: dizziness, Bell's Palsy, migraines, asthma, depression with anxiety, bipolar disorder.  Personal factors affecting pt at time of eval include: lives in 2 story house, stairs to enter home, and positive fall history. The following objective measures performed on IE also reveal limitations: Barthel Index: 55/100, Modified [place]: 4 (moderate/severe disability), and AM-PAC 6-Clicks: 57/71. Pt's clinical presentation is currently unstable/unpredictable seen in pt's presentation of abnormal lab value(s), ongoing medical assessment, on telemetry monitoring, and pending brain MRI to r/o CVA . Overall, pt's rehab potential and prognosis to return to PLOF is good as impacted by objective findings, warranting pt to receive further skilled PT interventions to address identified impairments, activity limitation(s), and participation restriction(s). Pt to benefit from continued PT tx to address deficits as defined above and maximize level of functional independent mobility. From PT/mobility standpoint, recommendation at time of d/c would be hopeful home with outpatient rehabilitation pending progress in order to facilitate return to PLOF. Barriers to Discharge: Inaccessible home environment     PT Discharge Recommendation: Home with outpatient rehabilitation (hopeful for OPPT therapy (vestibular emphasis?) pending progress and further medical workup. vs STR)    See flowsheet documentation for full assessment.

## 2023-10-26 NOTE — ASSESSMENT & PLAN NOTE
Continue Lamictal  Hold zyprexa and Klonopin while in 24 hour post TNK window  Resume home meds this AM

## 2023-10-26 NOTE — PROGRESS NOTES
[address]  Progress Note  Name: Thu Caballero  MRN: [id number]  Unit/Bed#:  I Date of Admission: 10/25/2023   Date of Service: 10/26/2023 I Hospital Day: 1    Assessment/Plan   * Dizziness  Assessment & Plan  Patient reports acute onset dizziness at 0500 with report of "the room spinning"  In the ED patient was CVA alert due to concern for posterior circulation stroke  Hx bells palsy with chronic left sided weakness  NIHSS 2 due to left facial droop  CTA negative for LVO/significant stenosis, CTH negative  TNK given 0911  Follow stroke pathway  Consult to Neuro, PT, OT, ST, PMR  Avoid sedating medications during 24 hour post TNK period  Repeat CTH for acute change in neuro exam or headache  ECHO EF 55% G1DD  Repeat CT 10/26 0900  MRI pending    Bell's palsy  Assessment & Plan  History of with residual left sided facial weakness  Pt reports baseline neuro symptoms    Migraines  Assessment & Plan  Noted in history  Takes Topamax daily at HS and sumatriptan at home PRN    Bipolar disorder (HCC)  Assessment & Plan  Continue Lamictal  Hold zyprexa and Klonopin while in 24 hour post TNK window  Resume home meds this AM    Depression with anxiety  Assessment & Plan  Holding Klonopin PRN during 24 hour post TNK window    Asthma  Assessment & Plan  Albuterol PRN             ICU Core Measures     A: Assess, Prevent, and Manage Pain Has pain been assessed? Yes  Need for changes to pain regimen? No   B: Both SAT/SAT  N/A   C: Choice of Sedation RASS Goal: N/A patient not on sedation  Need for changes to sedation or analgesia regimen? NA   D: Delirium CAM-ICU: Negative   E: Early Mobility  Plan for early mobility? Yes   F: Family Engagement Plan for family engagement today? Yes         Prophylaxis:  VTE Contraindicated secondary to: TNK   Stress Ulcer  not ordered       Subjective   HPI/24hr events:   No acute events overnight    Review of Systems   Constitutional: Negative.     Respiratory: Negative. Gastrointestinal: Negative. Genitourinary: Negative. Musculoskeletal: Negative. Neurological:  Positive for facial asymmetry. Objective                            Vitals I/O      Most Recent Min/Max in 24hrs   Temp 97.7 °F (36.5 °C) Temp  Min: 97.7 °F (36.5 °C)  Max: 98.2 °F (36.8 °C)   Pulse 65 Pulse  Min: 59  Max: 78   Resp 15 Resp  Min: 13  Max: 34   BP 96/57 BP  Min: 96/57  Max: 177/84   O2 Sat 92 % SpO2  Min: 92 %  Max: 98 %    No intake or output data in the 24 hours ending 10/26/23 0419      Diet Regular; Regular House     Invasive Monitoring Physical exam    Physical Exam  Eyes:      Pupils: Pupils are equal, round, and reactive to light. Skin:     General: Skin is warm and dry. HENT:      Head: Normocephalic. Mouth/Throat:      Mouth: Mucous membranes are moist.   Cardiovascular:      Rate and Rhythm: Normal rate and regular rhythm. Pulses: Normal pulses. Heart sounds: Normal heart sounds. Abdominal:      Palpations: Abdomen is soft. Tenderness: There is no abdominal tenderness. Constitutional:       General: She is not in acute distress. Pulmonary:      Effort: Pulmonary effort is normal.      Breath sounds: Normal breath sounds. Neurological:      Mental Status: She is alert and oriented to person, place and time. Mental status is at baseline. She is CAM ICU negative. Cranial Nerves: Facial asymmetry present. Motor: gross motor function is at baseline for patient. Strength full and intact in all extremities. Vitals and nursing note reviewed. Diagnostic Studies      EKG: NSR  Imaging:  I have personally reviewed pertinent reports.        Medications:  Scheduled PRN   atorvastatin, 40 mg, QPM  chlorhexidine, 15 mL, Q12H ARI  gabapentin, 300 mg, TID  lamoTRIgine, 200 mg, Daily  lidocaine, 2 patch, Daily  meclizine, 12.5 mg, Q8H ARI  nicotine, 1 patch, Daily  OLANZapine, 5 mg, HS  topiramate, 100 mg, HS acetaminophen, 650 mg, Q4H PRN  albuterol, 1 puff, Q6H PRN  cyclobenzaprine, 5 mg, TID PRN  influenza vaccine, 0.5 mL, Prior to discharge  ondansetron, 4 mg, Q4H PRN       Continuous          Labs:    CBC    Recent Labs     10/25/23  0804   WBC 8.33   HGB 13.2   HCT 39.0        BMP    Recent Labs     10/25/23  0804   SODIUM 141   K 4.6      CO2 24   AGAP 9   BUN 17   CREATININE 0.80   CALCIUM 9.6       Coags    Recent Labs     10/25/23  0804   INR 0.98   PTT 30        Additional Electrolytes  No recent results       Blood Gas    No recent results  No recent results LFTs  No recent results    Infectious  No recent results  Glucose  Recent Labs     10/25/23  0804   GLUC 102               Critical Care Time Delivered : Upon my evaluation, this patient had a high probability of imminent or life-threatening deterioration due to Post TNK, which required my direct attention, intervention, and personal management. I have personally provided 30 minutes of critical care time, exclusive of procedures, teaching, family meetings, and any prior time recorded by providers other than myself.      Chapis Saucedo

## 2023-10-26 NOTE — ASSESSMENT & PLAN NOTE
Patient reports acute onset dizziness at 0500 with report of "the room spinning"  In the ED patient was CVA alert due to concern for posterior circulation stroke  Hx bells palsy with chronic left sided weakness  NIHSS 2 due to left facial droop  CTA negative for LVO/significant stenosis, CTH negative  TNK given 0911  Follow stroke pathway  Consult to Neuro, PT, OT, ST, PMR  Repeat CTH negative  ECHO EF 55% G1DD  Repeat CT 10/26 0900  MRI pending

## 2023-10-26 NOTE — OCCUPATIONAL THERAPY NOTE
Occupational Therapy Evaluation      Dani Dallas    10/26/2023    Patient Active Problem List   Diagnosis    Dizziness    Bell's palsy    Migraines    Asthma    Depression with anxiety    Bipolar disorder [facility])       Past Medical History:   Diagnosis Date    Anxiety     Asthma     Bipolar 1 disorder ([facility])     Depression     Psychiatric disorder        Past Surgical History:   Procedure Laterality Date     SECTION          10/26/23 1201   OT Last Visit   OT Visit Date 10/26/23   Note Type   Note type Evaluation   Additional Comments Pt agreeable to OT eval. Upon arrival pt supine in bed with HOB elevated. Pain Assessment   Pain Assessment Tool 0-10   Pain Score No Pain   Restrictions/Precautions   Weight Bearing Precautions Per Order No   Braces or Orthoses   (pt reports she owns braces for leg but does not use)   Other Precautions Multiple lines;Telemetry; Fall Risk   Home Living   Type of [address] [place] [address] Dr Two level;Performs ADLs on one level; Able to live on main level with bedroom/bathroom;Stairs to enter with rails  (FOS to main level)   Bathroom Shower/Tub Tub/shower unit   Bathroom Toilet Standard   Bathroom Equipment   (no DME reported)   [address]   (no AD used at baseline)   Prior Function   Level of [place] Independent with ADLs; Independent with functional mobility; Independent with IADLS   Lives With [facility] Help From Family   IADLs Independent with driving; Independent with meal prep; Independent with medication management   Falls in the last 6 months 1 to 4   Vocational Workers comp   Lifestyle   Autonomy Patient reporting being independent with ADLs/IADLs, ambulatory with no AD, and lives with family   Reciprocal Relationships Supportive family   Service to Others Workers comp   ADL   Eating Assistance 6  Modified independent   [address] [place] [address] 3 [phone] [facility] Assistance 4  Minimal Assistance   UB Dressing Assistance 4  Minimal Assistance   LB Dressing Assistance 3  Moderate [address] [place]  4  Minimal Assistance   Additional Comments ADL levels based on functional performance during OT eval   Bed Mobility   Rolling R 4  Minimal assistance   Additional items Assist x 1;HOB elevated; Bedrails; Increased time required;Verbal cues   Supine to Sit 3  Moderate assistance   Additional items Assist x 1;HOB elevated; Bedrails; Increased time required;Verbal cues   Sit to Supine 4  Minimal assistance   Additional items Assist x 1;HOB elevated; Bedrails; Increased time required;Verbal cues   Additional Comments Pt reported (+) dizziness with all transitional movements/head movements. Pt reported increase in symptoms upon sitting EOB. Pt required mod assist of 1 to maintain static sitting balance. Symptoms increased with prolong sitting and requested to return BTB. RN made aware. Transfers   Sit to Stand Unable to assess   Balance   Static Sitting Poor +   Dynamic Sitting Poor   Activity Tolerance   Activity Tolerance Patient limited by fatigue;Treatment limited secondary to medical complications (Comment)  (Dizziness)   Medical Staff Made Aware Pt seen as a co-eval with PT due to the patient's co-morbidities and clinically unstable presentation indicated by chart review. RUE Assessment   RUE Assessment WFL   LUE Assessment   LUE Assessment WFL   Hand Function   Gross Motor Coordination Functional   Fine Motor Coordination Functional   Sensation   Light Touch Severe deficits in the RLE  (impaired sensation in RLE since fall few weeks ago)   Vision-Basic Assessment   Current Vision Wears glasses all the time  (but prefers not to wear)   Cognition   Overall Cognitive Status [facility]   Arousal/Participation Alert; Responsive; Cooperative   Attention Within functional limits   Orientation Level Oriented X4   Memory Within functional limits   Following Commands Follows all commands and directions without difficulty   Assessment   Limitation Decreased ADL status; Decreased UE strength;Decreased endurance;Decreased self-care trans;Decreased high-level ADLs; Decreased sensation   Prognosis Good   Assessment Patient is a 59 y.o. female seen for OT evaluation s/p admit to [facility]  on 10/25/2023 w/Dizziness. Commorbidities affecting patient's functional performance at time of assessment include: bell's palsy, migraines, asthma, depression/anxiety, and bipolar disorder. Orders placed for OT evaluation and treatment and out of bed to chair. Performed at least two patient identifiers during session including name and wristband. Prior to admission, Patient reporting being independent with ADLs/IADLs, ambulatory with no AD, and lives with family. Personal factors affecting patient at time of initial evaluation include: steps to enter, difficulty performing ADLs, and difficulty performing IADLs. Upon evaluation, patient requires supervision and minimal  assist for UB ADLs, minimal  and moderate assist for LB ADLs. Patient is oriented x 4. Occupational performance is affected by the following deficits: decreased muscle strength, dynamic sit/ stand balance deficit with poor standing tolerance time for self care and functional mobility, decreased activity tolerance, impaired sensation, and delayed righting and equilibrium reactions. Patient to benefit from continued Occupational Therapy treatment while in the hospital to address deficits as defined above and maximize level of functional independence with ADLs and functional mobility. Occupational Performance areas to address include: bathing/ shower, dressing, toilet hygiene, transfer to all surfaces, functional ambulation, medication routine/ management, IADLS: Household maintenance, IADLs: safety procedures, and IADLs: meal prep/ clean up.  From OT standpoint, recommendation at time of d/c would be Home with outpatient rehabilitation. Goals   Patient Goals to get better and go home   Plan   Treatment Interventions ADL retraining;Functional transfer training;UE strengthening/ROM; Endurance training;Patient/family training; Compensatory technique education; Activityengagement   Goal Expiration Date 11/05/23   OT Treatment Day 0   OT Frequency 1-2x/wk   Discharge Recommendation   OT Discharge Recommendation Home with outpatient rehabilitation  (c family support)   AM-PAC Daily Activity Inpatient   Lower Body Dressing 2   Bathing 3   Toileting 3   Upper Body Dressing 3   Grooming 4   Eating 4   Daily Activity Raw Score 19   Daily Activity Standardized Score (Calc for Raw Score >=11) 40.22   AM-PAC Applied Cognition Inpatient   Following a Speech/Presentation 4   Understanding Ordinary Conversation 4   Taking Medications 3   Remembering Where Things Are Placed or Put Away 3   Remembering List of 4-5 Errands 3   Taking Care of Complicated Tasks 3   Applied Cognition Raw Score 20   Applied Cognition Standardized Score 41.76   Modified [place] Scale   Modified Xochilt Scale 4   End of Consult   Patient Position at End of Consult Supine; All needs within reach   Nurse Communication Nurse aware of consult     GOALS:    *ADL transfers with (I) for inc'd independence with ADLs/purposeful tasks    *UB ADL with (I) for inc'd independence with self cares    *LB ADL with (I) using AE prn for inc'd independence with self cares    *Toileting with (I) for clothing management and hygiene for return to PLOF with personal care    *Increase stand tolerance x 5  m for inc'd tolerance with standing purposeful tasks    *Participate in 10m UE therex to increase overall stamina/activity tolerance for purposeful tasks    *Bed mobility- (I) for inc'd independence to manage own comfort and initiate EOB & OOB purposeful tasks    *Patient will verbalize 3 safety awareness/ principles to prevent falls in the home setting.      *Patient will increase OOB/sitting tolerance to 2-4 hours per day to increase participation in self-care and leisure tasks with no s/s of exertion.      Abbie Guy, PATRICIA, OTR/L

## 2023-10-26 NOTE — UTILIZATION REVIEW
Initial Clinical Review    Admission: Date/Time/Statement:   Admission Orders (From admission, onward)       Ordered        10/25/23 0931  INPATIENT ADMISSION  Once                          Orders Placed This Encounter   Procedures    INPATIENT ADMISSION     Standing Status:   Standing     Number of Occurrences:   1     Order Specific Question:   Level of Care     Answer:   Critical Care [15]     Order Specific Question:   Estimated length of stay     Answer:   More than 2 Midnights     Order Specific Question:   Certification     Answer:   I certify that inpatient services are medically necessary for this patient for a duration of greater than two midnights. See H&P and MD Progress Notes for additional information about the patient's course of treatment. ED Arrival Information       Expected   -    Arrival   10/25/2023 07:10    Acuity   Urgent              Means of arrival   Ambulance    Escorted by   [facility]/ICU    Admission type   Emergency              Arrival complaint   DIZZINESS             Chief Complaint   Patient presents with    Dizziness     Pt woke up this morning with the room spinning. Initial Presentation: 59 y.o. female to the ED from home via EMS with complaints of dizziness, room spinning which started about 2 hours prior to arrival. Admitted to CRITICAL CARE s/p TNK,  dizziness. H/O migraines, asthma, and bipolar disorder . Arrives as stroke alert, with GCs 15, exam limited due to poor patient effort. Has chronic left sided facial droop. CT head shows no acute findings. As per neurology at the bedside, NIHSS2.  DIscussed TNK and decided to have it. Also given IV valium. TNK given. Check ECHO, MRI. Monitor tele. Frequent neuro checks. Tearful, anxious. Left sided facial paralysis due to bell's palsy. Mild nausea. Speech eval:  Regular diet, thin liquids.    Date: 10/26   Day 2:       ED Triage Vitals   Temperature Pulse Respirations Blood Pressure SpO2   10/25/23 0743 10/25/23 0743 10/25/23 0743 10/25/23 0743 10/25/23 0743   98.2 °F (36.8 °C) 72 15 (!) 173/82 98 %      Temp Source Heart Rate Source Patient Position - Orthostatic VS BP Location FiO2 (%)   10/25/23 1115 10/25/23 1115 10/25/23 1200 10/25/23 1200 --   Oral Monitor Lying Left arm       Pain Score       10/25/23 0749       6          Wt Readings from Last 1 Encounters:   10/26/23 84.3 kg (185 lb 13.6 oz)     Additional Vital Signs:   /Time Temp Pulse Resp BP MAP (mmHg) SpO2 O2 Device Patient Position - Orthostatic VS   10/26/23 0800 -- 58 13 93/50 67 91 % None (Room air) --   10/26/23 0700 97.5 °F (36.4 °C) 57 14 121/60 86 92 % -- --   10/26/23 0221 97.7 °F (36.5 °C) 65 15 96/57 69 92 % -- Lying   10/26/23 0200 -- 67 15 96/57 69 92 % -- --   10/26/23 0000 -- 71 16 102/63 77 93 % -- --   10/25/23 2300 -- 66 16 97/52 70 93 % -- --   10/25/23 2100 -- 70 16 104/57 74 92 % -- --   10/25/23 2000 -- 69 17 103/57 75 94 % -- --   10/25/23 1900 98.2 °F (36.8 °C) 73 15 101/53 73 92 % None (Room air) Lying   10/25/23 1818 -- 75 18 -- -- 94 % None (Room air) --   10/25/23 1800 -- 70 18 108/61 79 92 % -- --   10/25/23 1700 -- 68 20 147/67 96 95 % -- --   10/25/23 1630 -- 64 19 130/62 89 95 % -- --   10/25/23 1600 -- 71 18 137/73 99 96 % -- --   10/25/23 1530 -- 64 18 152/67 96 96 % -- --   10/25/23 1500 -- 62 17 128/64 90 -- -- --   10/25/23 1400 98 °F (36.7 °C) 66 16 127/70 92 95 % None (Room air) Lying   10/25/23 1330 -- 63 15 132/62 89 95 % -- Lying   10/25/23 1300 -- 67 21 147/71 102 95 % -- --   10/25/23 1200 -- 64 20 170/81 115 95 % -- Lying   10/25/23 1130 -- 65 15 160/87 116 97 % -- --   10/25/23 1115 98.2 °F (36.8 °C) 72 16 137/64 94 95 % -- --   10/25/23 1100 -- 70 17 144/70 100 98 % -- --   10/25/23 1045 -- 59 13 141/74 100 96 % -- --   10/25/23 1030 -- 63 16 163/80 115 96 % -- --   10/25/23 1015 -- 65 16 148/70 102 94 % -- --   10/25/23 1000 -- 63 19 135/70 97 96 % -- --   10/25/23 0945 -- 63 16 138/66 93 95 % -- --   10/25/23 0930 -- 67 19 144/70 100 95 % -- --   10/25/23 0915 -- 65 16 152/72 103 95 % -- --   10/25/23 0900 -- 67 15 141/68 96 94 % -- --   10/25/23 0845 -- 70 19 159/73 105 93 % -- --   10/25/23 0831 -- 72 15 167/77 -- 98 % -- --   10/25/23 0830 -- 73 19 167/77 110 96 % -- --   10/25/23 0815 -- 78 34 Abnormal  164/75 -- 96 % -- --   10/25/23 0800 -- 73 21 177/84 Abnormal  118 96 % -- --   10/25/23 0743 98.2 °F (36.8 °C) 72 15 173/82 Abnormal  -- 98 % -- --     Pertinent Labs/Diagnostic Test Results:   10/25 ECHO: This transthoracic echocardiogram was performed at bedside. This was a routine, inpatient study. Study quality was adequate. A complete 2D, color flow Doppler, spectral Doppler, 2D, color flow Doppler and spectral Doppler transthoracic echocardiogram was performed. The apical, parasternal, subcostal and suprasternal views were obtained. Patient exhibited sinus rhythm. 10/25 EKG: Normal sinus rhythm  Cannot rule out Anterior infarct , age undetermined  Abnormal ECG  No previous ECGs available  CTA stroke alert (head/neck)   Final Result by Tara Boykin MD (10/25 3414)   1. CTA head: Negative for large vessel intracranial occlusion or hemodynamically significant stenosis. 2. CTA neck:  No extracranial carotid stenosis. The cervical vertebral arteries are patent. Findings were directly discussed with Dr. Natasha Munoz at 8:45 a.m. Workstation performed: [id number]         CT stroke alert brain   Final Result by Tara Boykin MD (10/25 1654)      No acute intracranial abnormality. Chronic microangiopathic ischemic changes. Findings were directly discussed with Dr. Natasha Munoz at 8:45 a.m.             Workstation performed: [id number]         MRI Inpatient Order    (Results Pending)   CT head wo contrast    (Results Pending)     Results from last 7 days   Lab Units 10/25/23  0810   SARS-COV-2  Negative     Results from last 7 days   Lab Units 10/26/23  0932 10/25/23  0804   WBC Thousand/uL 7.91 8.33   HEMOGLOBIN g/dL 13.2 13.2   HEMATOCRIT % 40.3 39.0   PLATELETS Thousands/uL 273 286         Results from last 7 days   Lab Units 10/26/23  0932 10/25/23  0804   SODIUM mmol/L 141 141   POTASSIUM mmol/L 3.6 4.6   CHLORIDE mmol/L 106 108   CO2 mmol/L 28 24   ANION GAP mmol/L 7 9   BUN mg/dL 18 17   CREATININE mg/dL 0.95 0.80   EGFR ml/min/1.73sq m 65 80   CALCIUM mg/dL 10.0 9.6             Results from last 7 days   Lab Units 10/26/23  0932 10/25/23  0804   GLUCOSE RANDOM mg/dL 114 102         Results from last 7 days   Lab Units 10/25/23  0804   HEMOGLOBIN A1C % 5.6   EAG mg/dl 114           Results from last 7 days   Lab Units 10/25/23  1023 10/25/23  0804   HS TNI 0HR ng/L  --  3   HS TNI 2HR ng/L 5  --    HSTNI D2 ng/L 2  --          Results from last 7 days   Lab Units 10/25/23  0804   PROTIME seconds 13.6   INR  0.98   PTT seconds 30           Results from last 7 days   Lab Units 10/25/23  0810   INFLUENZA A PCR  Negative   INFLUENZA B PCR  Negative   RSV PCR  Negative             ED Treatment:   Medication Administration from 10/25/2023 0709 to 10/25/2023 1119         Date/Time Order Dose Route Action Comments     10/25/2023 0811 EDT ondansetron (ZOFRAN) injection 4 mg 4 mg Intravenous Given --     10/25/2023 0843 EDT diazepam (VALIUM) injection 5 mg 5 mg Intravenous Given --     10/25/2023 0911 EDT tenecteplase (TNKase) injection 21 mg 21 mg Intravenous Given --          Past Medical History:   Diagnosis Date    Anxiety     Asthma     Bipolar 1 disorder ([address])     Depression     Psychiatric disorder        Admitting Diagnosis: Dizziness [R42]  Stroke-like symptoms [R29.90]  Age/Sex: 59 y.o. female  Admission Orders:  Scheduled Medications:  atorvastatin, 40 mg, Oral, QPM  chlorhexidine, 15 mL, Mouth/Throat, Q12H ARI  gabapentin, 300 mg, Oral, TID  lamoTRIgine, 200 mg, Oral, Daily  lidocaine, 2 patch, Topical, Daily  meclizine, 12.5 mg, Oral, Q8H [facility]  nicotine, 1 patch, Transdermal, Daily  OLANZapine, 5 mg, Oral, HS  potassium chloride, 40 mEq, Oral, Once  topiramate, 100 mg, Oral, HS      Continuous IV Infusions:     PRN Meds:  acetaminophen, 650 mg, Oral, Q4H PRN  albuterol, 1 puff, Inhalation, Q6H PRN  cyclobenzaprine, 5 mg, Oral, TID PRN  influenza vaccine, 0.5 mL, Intramuscular, Prior to discharge  ondansetron, 4 mg, Intravenous, Q4H PRN        IP CONSULT TO NEUROLOGY  IP CONSULT TO PHYSICAL MEDICINE REHAB  IP CONSULT TO CASE MANAGEMENT  IP CONSULT TO NUTRITION SERVICES    Network Utilization Review Department  ATTENTION: Please call with any questions or concerns to [phone] and carefully listen to the prompts so that you are directed to the right person. All voicemails are confidential.   For Discharge needs, contact Care Management DC Support Team at [phone] opt. [phone]  Send all requests for admission clinical reviews, approved or denied determinations and any other requests to dedicated fax number below belonging to the campus where the patient is receiving treatment.  List of dedicated fax numbers for the Facilities:  [place] DENIALS (Administrative/Medical Necessity) [phone]   DISCHARGE SUPPORT TEAM (NETWORK) [id number] (Maternity/NICU/Pediatrics) [phone]   [address] [place] [address] Drive [address] [facility] 1000 [facility] [phone]   1503 [facility] [address] [facility] [address] [facility] [address] [address] [facility] [address] 1300 [facility] [address] [place] [address] [phone]

## 2023-10-26 NOTE — ASSESSMENT & PLAN NOTE
Patient reports acute onset dizziness at 0500 with report of "the room spinning"  In the ED patient was CVA alert due to concern for posterior circulation stroke  Hx bells palsy with chronic left sided weakness  NIHSS 2 due to left facial droop  CTA negative for LVO/significant stenosis, CTH negative  TNK given 0911  Follow stroke pathway  Consult to Neuro, PT, OT, ST, PMR  Avoid sedating medications during 24 hour post TNK period  Repeat CTH for acute change in neuro exam or headache  ECHO EF 55% G1DD  Repeat CT 10/26 0900  MRI pending

## 2023-10-26 NOTE — PLAN OF CARE
Problem: Nutrition/Hydration-ADULT  Goal: Nutrient/Hydration intake appropriate for improving, restoring or maintaining nutritional needs  Description: Monitor and assess patient's nutrition/hydration status for malnutrition. Collaborate with interdisciplinary team and initiate plan and interventions as ordered. Monitor patient's weight and dietary intake as ordered or per policy. Utilize nutrition screening tool and intervene as necessary. Determine patient's food preferences and provide high-protein, high-caloric foods as appropriate. INTERVENTIONS:  - Monitor oral intake, urinary output, labs, and treatment plans  - Assess nutrition and hydration status and recommend course of action  - Evaluate amount of meals eaten  - Assist patient with eating if necessary   - Allow adequate time for meals  - Recommend/ encourage appropriate diets, oral nutritional supplements, and vitamin/mineral supplements  - Order, calculate, and assess calorie counts as needed  - Recommend, monitor, and adjust tube feedings and TPN/PPN based on assessed needs  - Assess need for intravenous fluids  - Provide specific nutrition/hydration education as appropriate  - Include patient/family/caregiver in decisions related to nutrition  Outcome: Progressing     Problem: Neurological Deficit  Goal: Neurological status is stable or improving  Description: Interventions:  - Monitor and assess patient's level of consciousness, motor function, sensory function, and level of assistance needed for ADLs. - Monitor and report changes from baseline. Collaborate with interdisciplinary team to initiate plan and implement interventions as ordered. - Provide and maintain a safe environment. - Consider seizure precautions. - Consider fall precautions. - Consider aspiration precautions. - Consider bleeding precautions. Outcome: Progressing     Problem:  Activity Intolerance/Impaired Mobility  Goal: Mobility/activity is maintained at optimum level for patient  Description: Interventions:  - Assess and monitor patient  barriers to mobility and need for assistive/adaptive devices. - Assess patient's emotional response to limitations. - Collaborate with interdisciplinary team and initiate plans and interventions as ordered. - Encourage independent activity per ability.  - Maintain proper body alignment. - Perform active/passive rom as tolerated/ordered. - Plan activities to conserve energy.  - Turn patient as appropriate  Outcome: Progressing     Problem: Communication Impairment  Goal: Ability to express needs and understand communication  Description: Assess patient's communication skills and ability to understand information. Patient will demonstrate use of effective communication techniques, alternative methods of communication and understanding even if not able to speak. - Encourage communication and provide alternate methods of communication as needed. - Collaborate with case management/ for discharge needs. - Include patient/family/caregiver in decisions related to communication. Outcome: Progressing     Problem: Potential for Aspiration  Goal: Non-ventilated patient's risk of aspiration is minimized  Description: Assess and monitor vital signs, respiratory status, and labs (WBC). Monitor for signs of aspiration (tachypnea, cough, rales, wheezing, cyanosis, fever). - Assess and monitor patient's ability to swallow. - Place patient up in chair to eat if possible. - HOB up at 90 degrees to eat if unable to get patient up into chair.  - Supervise patient during oral intake. - Instruct patient/ family to take small bites. - Instruct patient/ family to take small single sips when taking liquids.   - Follow patient-specific strategies generated by speech pathologist.  Outcome: Progressing     Problem: Nutrition  Goal: Nutrition/Hydration status is improving  Description: Monitor and assess patient's nutrition/hydration status for malnutrition (ex- brittle hair, bruises, dry skin, pale skin and conjunctiva, muscle wasting, smooth red tongue, and disorientation). Collaborate with interdisciplinary team and initiate plan and interventions as ordered. Monitor patient's weight and dietary intake as ordered or per policy. Utilize nutrition screening tool and intervene per policy. Determine patient's food preferences and provide high-protein, high-caloric foods as appropriate. - Assist patient with eating.  - Allow adequate time for meals.  - Encourage patient to take dietary supplement as ordered. - Collaborate with clinical nutritionist.  - Include patient/family/caregiver in decisions related to nutrition.   Outcome: Progressing     Problem: COPING  Goal: Pt/Family able to verbalize concerns and demonstrate effective coping strategies  Description: INTERVENTIONS:  - Assist patient/family to identify coping skills, available support systems and cultural and spiritual values  - Provide emotional support, including active listening and acknowledgement of concerns of patient and caregivers  - Reduce environmental stimuli, as able  - Provide patient education  - Assess for spiritual pain/suffering and initiate spiritual care, including notification of Pastoral Care or ronny based community as needed  - Assess effectiveness of coping strategies  Outcome: Progressing  Goal: Will report anxiety at manageable levels  Description: INTERVENTIONS:  - Administer medication as ordered  - Teach and encourage coping skills  - Provide emotional support  - Assess patient/family for anxiety and ability to cope  Outcome: Progressing     Problem: NEUROSENSORY - ADULT  Goal: Achieves stable or improved neurological status  Description: INTERVENTIONS  - Monitor and report changes in neurological status  - Monitor vital signs such as temperature, blood pressure, glucose, and any other labs ordered   - Initiate measures to prevent increased intracranial pressure  - Monitor for seizure activity and implement precautions if appropriate      Outcome: Progressing     Problem: PAIN - ADULT  Goal: Verbalizes/displays adequate comfort level or baseline comfort level  Description: Interventions:  - Encourage patient to monitor pain and request assistance  - Assess pain using appropriate pain scale  - Administer analgesics based on type and severity of pain and evaluate response  - Implement non-pharmacological measures as appropriate and evaluate response  - Consider cultural and social influences on pain and pain management  - Notify physician/advanced practitioner if interventions unsuccessful or patient reports new pain  Outcome: Progressing     Problem: INFECTION - ADULT  Goal: Absence or prevention of progression during hospitalization  Description: INTERVENTIONS:  - Assess and monitor for signs and symptoms of infection  - Monitor lab/diagnostic results  - Monitor all insertion sites, i.e. indwelling lines, tubes, and drains  - Monitor endotracheal if appropriate and nasal secretions for changes in amount and color  - [place] appropriate cooling/warming therapies per order  - Administer medications as ordered  - Instruct and encourage patient and family to use good hand hygiene technique  - Identify and instruct in appropriate isolation precautions for identified infection/condition  Outcome: Progressing     Problem: SAFETY ADULT  Goal: Patient will remain free of falls  Description: INTERVENTIONS:  - Educate patient/family on patient safety including physical limitations  - Instruct patient to call for assistance with activity   - Consult OT/PT to assist with strengthening/mobility   - Keep Call bell within reach  - Keep bed low and locked with side rails adjusted as appropriate  - Keep care items and personal belongings within reach  - Initiate and maintain comfort rounds  - Make Fall Risk Sign visible to staff  - Apply yellow socks and bracelet for high fall risk patients  - Consider moving patient to room near nurses station  Outcome: Progressing  Goal: Maintain or return to baseline ADL function  Description: INTERVENTIONS:  -  Assess patient's ability to carry out ADLs; assess patient's baseline for ADL function and identify physical deficits which impact ability to perform ADLs (bathing, care of mouth/teeth, toileting, grooming, dressing, etc.)  - Assess/evaluate cause of self-care deficits   - Assess range of motion  - Assess patient's mobility; develop plan if impaired  - Assess patient's need for assistive devices and provide as appropriate  - Encourage maximum independence but intervene and supervise when necessary  - Involve family in performance of ADLs  - Assess for home care needs following discharge   - Consider OT consult to assist with ADL evaluation and planning for discharge  - Provide patient education as appropriate  Outcome: Progressing  Goal: Maintains/Returns to pre admission functional level  Description: INTERVENTIONS:  - Perform BMAT or MOVE assessment daily.   - Set and communicate daily mobility goal to care team and patient/family/caregiver.    - Collaborate with rehabilitation services on mobility goals if consulted  - Out of bed for toileting  - Record patient progress and toleration of activity level   Outcome: Progressing     Problem: DISCHARGE PLANNING  Goal: Discharge to home or other facility with appropriate resources  Description: INTERVENTIONS:  - Identify barriers to discharge w/patient and caregiver  - Arrange for needed discharge resources and transportation as appropriate  - Identify discharge learning needs (meds, wound care, etc.)  - Arrange for interpretive services to assist at discharge as needed  - Refer to Case Management Department for coordinating discharge planning if the patient needs post-hospital services based on physician/advanced practitioner order or complex needs related to functional status, cognitive ability, or social support system  Outcome: Progressing     Problem: Knowledge Deficit  Goal: Patient/family/caregiver demonstrates understanding of disease process, treatment plan, medications, and discharge instructions  Description: Complete learning assessment and assess knowledge base. Interventions:  - Provide teaching at level of understanding  - Provide teaching via preferred learning methods  Outcome: Progressing     Problem: MOBILITY - ADULT  Goal: Maintain or return to baseline ADL function  Description: INTERVENTIONS:  -  Assess patient's ability to carry out ADLs; assess patient's baseline for ADL function and identify physical deficits which impact ability to perform ADLs (bathing, care of mouth/teeth, toileting, grooming, dressing, etc.)  - Assess/evaluate cause of self-care deficits   - Assess range of motion  - Assess patient's mobility; develop plan if impaired  - Assess patient's need for assistive devices and provide as appropriate  - Encourage maximum independence but intervene and supervise when necessary  - Involve family in performance of ADLs  - Assess for home care needs following discharge   - Consider OT consult to assist with ADL evaluation and planning for discharge  - Provide patient education as appropriate  Outcome: Progressing  Goal: Maintains/Returns to pre admission functional level  Description: INTERVENTIONS:  - Perform BMAT or MOVE assessment daily.   - Set and communicate daily mobility goal to care team and patient/family/caregiver.    - Collaborate with rehabilitation services on mobility goals if consulted  - Out of bed for toileting  - Record patient progress and toleration of activity level   Outcome: Progressing     Problem: Prexisting or High Potential for Compromised Skin Integrity  Goal: Skin integrity is maintained or improved  Description: INTERVENTIONS:  - Identify patients at risk for skin breakdown  - Assess and monitor skin integrity  - Assess and monitor nutrition and hydration status  - Monitor labs   - Assess for incontinence   - Turn and reposition patient  - Assist with mobility/ambulation  - Relieve pressure over bony prominences  - Avoid friction and shearing  - Provide appropriate hygiene as needed including keeping skin clean and dry  - Evaluate need for skin moisturizer/barrier cream  - Collaborate with interdisciplinary team   - Patient/family teaching  - Consider wound care consult   Outcome: Progressing

## 2023-10-26 NOTE — PHYSICAL THERAPY NOTE
Physical Therapy Evaluation     Patient's Name: Willam Dumas    Admitting Diagnosis  Dizziness [R42]  Stroke-like symptoms [R29.90]    Problem List  Patient Active Problem List   Diagnosis    Dizziness    Bell's palsy    Migraines    Asthma    Depression with anxiety    Bipolar disorder [facility])       Past Medical History  Past Medical History:   Diagnosis Date    Anxiety     Asthma     Bipolar 1 disorder ([address] )     Depression     Psychiatric disorder        Past Surgical History  Past Surgical History:   Procedure Laterality Date     SECTION          10/26/23 1202   PT Last Visit   PT Visit Date 10/26/23   Note Type   Note type Evaluation   Pain Assessment   Pain Assessment Tool 0-10   Pain Score   (no numeric score provided)   Pain Location/Orientation Orientation: Lower; Location: Back   Restrictions/Precautions   Weight Bearing Precautions Per Order No   Braces or Orthoses   (pt reports she owns braces for leg but does not use)   Other Precautions Multiple lines;Telemetry; Fall Risk   Home Living   Type of [address] [place] [address] Two level;Performs ADLs on one level; Able to live on main level with bedroom/bathroom;Stairs to enter with rails  (FOS to enter to main level)   Bathroom Shower/Tub Tub/shower unit   Bathroom Toilet Standard   Bathroom Equipment   (no DME reported)   [address]   (no AD used at baseline)   Prior Function   Level of [place] Independent with ADLs; Independent with functional mobility; Independent with IADLS   Lives With [facility] Help From Family; Outpatient therapy   IADLs Independent with driving; Independent with meal prep; Independent with medication management   Falls in the last 6 months 1 to 4   Vocational Workers comp   General   Family/Caregiver Present Yes   Cognition   Overall Cognitive Status WFL   Arousal/Participation Alert   Orientation Level Oriented X4  (oriented to month and year)   Memory Within functional limits Following Commands Follows all commands and directions without difficulty   RLE Assessment   RLE Assessment WFL  (grossly 5/5 observed with functional mobility, symmetrical)   LLE Assessment   LLE Assessment WFL  (grossly 5/5 observed with functional mobility, symmetrical)   Vision-Basic Assessment   Current Vision Wears glasses all the time  (but prefers not to wear)   Coordination   Movements are Fluid and Coordinated 1   Sensation WFL   Bed Mobility   Supine to Sit 3  Moderate assistance   Additional items Assist x 1;HOB elevated; Bedrails; Increased time required;Verbal cues   Sit to Supine 4  Minimal assistance   Additional items Assist x 1;HOB elevated; Bedrails; Increased time required;Verbal cues   Additional Comments pt sat EOB for 2-3min requiring mod A for trunk support, pt noted significant dizziness for duration of EOB static sitting trial, pt cued to open eyes, however pt not able to perform 2* dizziness. Pt requesting to return BTB d/t dizziness, declining further mobility at this time   Balance   Static Sitting Poor +   Dynamic Sitting Poor   Static Standing   (DNT)   Endurance Deficit   Endurance Deficit Yes   Activity Tolerance   Activity Tolerance Patient limited by fatigue;Treatment limited secondary to medical complications (Comment)  (dizziness)   Medical Staff Made Aware Pt seen as a co-eval with OT due to the patient's co-morbidities, clinically unstable presentation, and present impairments which are a regression from the patient's baseline. Nurse Made Aware RN Miladis Barron   Assessment   Prognosis Good   Problem List Decreased endurance; Impaired balance;Decreased mobility;Pain   Assessment Pt is 59 y.o. female seen for PT evaluation on 10/26/2023 [id number] w/ Dizziness. PT was consulted to assess pt's functional mobility and d/c needs. Order placed for PT eval and tx.  PTA, pt resides with family in [facility] with FOS to enter, ambulates without AD at baseline, +fall history. At time of eval, pt requiring mod A for supine to sit transfer, min A for sit to supine transfer; further mobility deferred by pt at this time 2* significant dizziness. Upon evaluation, pt presenting with impaired functional mobility d/t decreased endurance, impaired balance, decreased mobility, pain, and activity intolerance. Pertinent PMHx and current co-morbidities affecting pt's physical performance at time of assessment include: dizziness, Bell's Palsy, migraines, asthma, depression with anxiety, bipolar disorder. Personal factors affecting pt at time of eval include: lives in 2 story house, stairs to enter home, and positive fall history. The following objective measures performed on IE also reveal limitations: Barthel Index: 55/100, Modified [place]: 4 (moderate/severe disability), and AM-PAC 6-Clicks: 93/12. Pt's clinical presentation is currently unstable/unpredictable seen in pt's presentation of abnormal lab value(s), ongoing medical assessment, on telemetry monitoring, and pending brain MRI to r/o CVA . Overall, pt's rehab potential and prognosis to return to PLOF is good as impacted by objective findings, warranting pt to receive further skilled PT interventions to address identified impairments, activity limitation(s), and participation restriction(s). Pt to benefit from continued PT tx to address deficits as defined above and maximize level of functional independent mobility. From PT/mobility standpoint, recommendation at time of d/c would be hopeful home with outpatient rehabilitation pending progress in order to facilitate return to PLOF.    Barriers to Discharge Inaccessible home environment   Goals   Patient Goals to improve the dizziness   STG Expiration Date 11/05/23   Short Term Goal #1 In 7-10 days: Perform all bed mobility tasks modified independent to decrease caregiver burden, Perform all transfers modified independent to improve independence, Ambulate > 250 ft. with least restrictive assistive device modified independent w/o LOB and w/ normalized gait pattern 100% of the time, Navigate 13 stairs modified independent with unilateral handrail to facilitate return to previous living environment, Increase all balance 1/2 grade to decrease risk for falls, and Ascertain vestibular deficits/needs   PT Treatment Day 0   Plan   Treatment/Interventions Functional transfer training;Elevations; Endurance training;Patient/family training;Equipment eval/education; Bed mobility;Gait training;Spoke to nursing;OT  (higher level balance, ascertain vestibular needs/deficits)   PT Frequency 3-5x/wk   Discharge Recommendation   PT Discharge Recommendation Home with outpatient rehabilitation  (hopeful for OPPT therapy (vestibular emphasis?) pending progress and further medical workup.   vs STR)   Equipment Recommended   (TBD)   AM-PAC Basic Mobility Inpatient   Turning in Flat Bed Without Bedrails 3   Lying on Back to Sitting on Edge of Flat Bed Without Bedrails 2   Moving Bed to Chair 2   Standing Up From Chair Using Arms 2   Walk in Room 2   Climb 3-5 Stairs With Railing 2   Basic Mobility Inpatient Raw Score 13   Basic Mobility Standardized Score 33.99   Highest Level Of Mobility   -[facility] Goal 4: Move to chair/commode   -HL Achieved 3: Sit at edge of bed   Modified [place] Scale   Modified [place] Scale 4   Barthel Index   Feeding 10   Bathing 0   Grooming Score 5   Dressing Score 5   Bladder Score 10   Bowels Score 10   Toilet Use Score 5   Transfers (Bed/Chair) Score 10   Mobility (Level Surface) Score 0   Stairs Score 0   Barthel Index Score 55           Tom Goldsmith, PT, DPT

## 2023-10-27 LAB
ANION GAP SERPL CALCULATED.3IONS-SCNC: 8 MMOL/L
BASOPHILS # BLD AUTO: 0.06 THOUSANDS/ÂΜL (ref 0–0.1)
BASOPHILS NFR BLD AUTO: 1 % (ref 0–1)
BUN SERPL-MCNC: 18 MG/DL (ref 5–25)
CALCIUM SERPL-MCNC: 9.7 MG/DL (ref 8.4–10.2)
CHLORIDE SERPL-SCNC: 106 MMOL/L (ref 96–108)
CO2 SERPL-SCNC: 25 MMOL/L (ref 21–32)
CREAT SERPL-MCNC: 0.91 MG/DL (ref 0.6–1.3)
EOSINOPHIL # BLD AUTO: 0.28 THOUSAND/ÂΜL (ref 0–0.61)
EOSINOPHIL NFR BLD AUTO: 3 % (ref 0–6)
ERYTHROCYTE [DISTWIDTH] IN BLOOD BY AUTOMATED COUNT: 12 % (ref 11.6–15.1)
GFR SERPL CREATININE-BSD FRML MDRD: 68 ML/MIN/1.73SQ M
GLUCOSE SERPL-MCNC: 113 MG/DL (ref 65–140)
HCT VFR BLD AUTO: 42.8 % (ref 34.8–46.1)
HGB BLD-MCNC: 14.1 G/DL (ref 11.5–15.4)
IMM GRANULOCYTES # BLD AUTO: 0.09 THOUSAND/UL (ref 0–0.2)
IMM GRANULOCYTES NFR BLD AUTO: 1 % (ref 0–2)
LYMPHOCYTES # BLD AUTO: 2.64 THOUSANDS/ÂΜL (ref 0.6–4.47)
LYMPHOCYTES NFR BLD AUTO: 26 % (ref 14–44)
MAGNESIUM SERPL-MCNC: 2.3 MG/DL (ref 1.9–2.7)
MCH RBC QN AUTO: 34.2 PG (ref 26.8–34.3)
MCHC RBC AUTO-ENTMCNC: 32.9 G/DL (ref 31.4–37.4)
MCV RBC AUTO: 104 FL (ref 82–98)
MONOCYTES # BLD AUTO: 1.02 THOUSAND/ÂΜL (ref 0.17–1.22)
MONOCYTES NFR BLD AUTO: 10 % (ref 4–12)
NEUTROPHILS # BLD AUTO: 6.1 THOUSANDS/ÂΜL (ref 1.85–7.62)
NEUTS SEG NFR BLD AUTO: 59 % (ref 43–75)
NRBC BLD AUTO-RTO: 0 /100 WBCS
PLATELET # BLD AUTO: 271 THOUSANDS/UL (ref 149–390)
PMV BLD AUTO: 10.9 FL (ref 8.9–12.7)
POTASSIUM SERPL-SCNC: 4.2 MMOL/L (ref 3.5–5.3)
RBC # BLD AUTO: 4.12 MILLION/UL (ref 3.81–5.12)
SODIUM SERPL-SCNC: 139 MMOL/L (ref 135–147)
WBC # BLD AUTO: 10.19 THOUSAND/UL (ref 4.31–10.16)

## 2023-10-27 PROCEDURE — 83735 ASSAY OF MAGNESIUM: CPT | Performed by: PHYSICIAN ASSISTANT

## 2023-10-27 PROCEDURE — 99232 SBSQ HOSP IP/OBS MODERATE 35: CPT | Performed by: STUDENT IN AN ORGANIZED HEALTH CARE EDUCATION/TRAINING PROGRAM

## 2023-10-27 PROCEDURE — 80048 BASIC METABOLIC PNL TOTAL CA: CPT | Performed by: PHYSICIAN ASSISTANT

## 2023-10-27 PROCEDURE — 85025 COMPLETE CBC W/AUTO DIFF WBC: CPT | Performed by: PHYSICIAN ASSISTANT

## 2023-10-27 RX ORDER — MECLIZINE HYDROCHLORIDE 25 MG/1
25 TABLET ORAL EVERY 8 HOURS SCHEDULED
Status: DISCONTINUED | OUTPATIENT
Start: 2023-10-27 | End: 2023-10-28 | Stop reason: HOSPADM

## 2023-10-27 RX ORDER — CLONAZEPAM 1 MG/1
1 TABLET ORAL 2 TIMES DAILY PRN
Status: DISCONTINUED | OUTPATIENT
Start: 2023-10-27 | End: 2023-10-28 | Stop reason: HOSPADM

## 2023-10-27 RX ADMIN — HEPARIN SODIUM 5000 UNITS: 5000 INJECTION INTRAVENOUS; SUBCUTANEOUS at 15:04

## 2023-10-27 RX ADMIN — GABAPENTIN 300 MG: 300 CAPSULE ORAL at 21:06

## 2023-10-27 RX ADMIN — GABAPENTIN 300 MG: 300 CAPSULE ORAL at 17:03

## 2023-10-27 RX ADMIN — CYCLOBENZAPRINE HYDROCHLORIDE 5 MG: 10 TABLET, FILM COATED ORAL at 15:08

## 2023-10-27 RX ADMIN — TOPIRAMATE 100 MG: 100 TABLET, FILM COATED ORAL at 21:06

## 2023-10-27 RX ADMIN — OLANZAPINE 5 MG: 2.5 TABLET, FILM COATED ORAL at 21:06

## 2023-10-27 RX ADMIN — MECLIZINE HYDROCHLORIDE 25 MG: 25 TABLET ORAL at 15:04

## 2023-10-27 RX ADMIN — ATORVASTATIN CALCIUM 40 MG: 40 TABLET, FILM COATED ORAL at 17:03

## 2023-10-27 RX ADMIN — MECLIZINE HYDROCHLORIDE 25 MG: 25 TABLET ORAL at 21:06

## 2023-10-27 RX ADMIN — HEPARIN SODIUM 5000 UNITS: 5000 INJECTION INTRAVENOUS; SUBCUTANEOUS at 21:06

## 2023-10-27 RX ADMIN — MECLIZINE HYDROCHLORIDE 12.5 MG: 25 TABLET ORAL at 05:28

## 2023-10-27 RX ADMIN — LAMOTRIGINE 200 MG: 100 TABLET ORAL at 08:30

## 2023-10-27 RX ADMIN — NICOTINE 1 PATCH: 7 PATCH, EXTENDED RELEASE TRANSDERMAL at 08:30

## 2023-10-27 RX ADMIN — GABAPENTIN 300 MG: 300 CAPSULE ORAL at 08:30

## 2023-10-27 RX ADMIN — CHLORHEXIDINE GLUCONATE 0.12% ORAL RINSE 15 ML: 1.2 LIQUID ORAL at 21:09

## 2023-10-27 RX ADMIN — ASPIRIN 81 MG: 81 TABLET, COATED ORAL at 08:30

## 2023-10-27 NOTE — PROGRESS NOTES
[address]  Progress Note  Name: Corey Jones  MRN: [id number]  Unit/Bed#: -01 I Date of Admission: 10/25/2023   Date of Service: 10/27/2023 I Hospital Day: 2    Assessment/Plan   Bipolar disorder [facility])  Assessment & Plan  Continue Zyprexa 5 mg at bedtime    Depression with anxiety  Assessment & Plan  Continue Klonopin twice daily as needed for anxiety  PDMP reviewed    Asthma  Assessment & Plan  Not in exacerbation, albuterol as needed    Migraines  Assessment & Plan  Currently without migraines, continue home medications Topamax, gabapentin and Lamictal    Bell's palsy  Assessment & Plan  With residual left-sided facial weakness    * Dizziness  Assessment & Plan  Presented with acute onset of dizziness  Patient was a stroke alert on admission, s/p TNK and monitored in ICU  CTA head negative for LVO/significant stenosis, CTA was negative  Repeat CT head s/p TNK without hemorrhage  2D echo showing EF 55%, G1 DD  MRI showing possible acute infarct, with neurology, suspect likely artifact  Does not require aspirin or statin at this time  Patient attempted to ambulate today, significantly dizzy with room spinning suspect likely BPPV  Orthostatic vitals were negative  Continue meclizine 25 mg 3 times daily, Klonopin. Needed for anxiety/dizziness  PT and OT recommend home with outpatient therapy, would also benefit with vestibular therapy and ENT referral  Hopeful for discharge home tomorrow             VTE Pharmacologic Prophylaxis:   Pharmacologic: Heparin  Mechanical VTE Prophylaxis in Place: Yes    Patient Centered Rounds: I have performed bedside rounds with nursing staff today.     Current Length of Stay: 2 day(s)    Current Patient Status: Inpatient   Certification Statement: The patient will continue to require additional inpatient hospital stay due to monitor overnight due to dizziness    Discharge Plan: tomorrow    Code Status: Level 1 - Full Code      Subjective:   Patient seen and examined today. Reported dizziness had improved. Around 11am, nurse reported patient feeling significant dizzy when sitting up. She does not recall episode but reported the room spinning. Improved now after resting in bed. Denies tinnitus. Objective:     Vitals:   Temp (24hrs), Av.6 °F (37 °C), Min:98 °F (36.7 °C), Max:98.9 °F (37.2 °C)    Temp:  [98 °F (36.7 °C)-98.9 °F (37.2 °C)] 98.9 °F (37.2 °C)  HR:  [] 123  Resp:  [16-21] 21  BP: ()/(50-90) 136/90  SpO2:  [92 %-96 %] 94 %  Body mass index is 33 kg/m². Input and Output Summary (last 24 hours): Intake/Output Summary (Last 24 hours) at 10/27/2023 1437  Last data filed at 10/27/2023 0846  Gross per 24 hour   Intake --   Output 450 ml   Net -450 ml       Physical Exam:     Physical Exam  Vitals and nursing note reviewed. Constitutional:       Appearance: She is normal weight. HENT:      Head: Normocephalic. Eyes:      Conjunctiva/sclera: Conjunctivae normal.   Cardiovascular:      Rate and Rhythm: Normal rate. Pulmonary:      Breath sounds: No wheezing or rhonchi. Abdominal:      General: Bowel sounds are normal. There is no distension. Palpations: Abdomen is soft. Tenderness: There is no abdominal tenderness. Musculoskeletal:         General: No swelling. Skin:     General: Skin is warm. Neurological:      General: No focal deficit present. Mental Status: She is alert. Mental status is at baseline.        Additional Data:     Labs:    Results from last 7 days   Lab Units 10/27/23  0531   WBC Thousand/uL 10.19*   HEMOGLOBIN g/dL 14.1   HEMATOCRIT % 42.8   PLATELETS Thousands/uL 271   NEUTROS PCT % 59   LYMPHS PCT % 26   MONOS PCT % 10   EOS PCT % 3     Results from last 7 days   Lab Units 10/27/23  0640   SODIUM mmol/L 139   POTASSIUM mmol/L 4.2   CHLORIDE mmol/L 106   CO2 mmol/L 25   BUN mg/dL 18   CREATININE mg/dL 0.91   ANION GAP mmol/L 8   CALCIUM mg/dL 9.7   GLUCOSE RANDOM mg/dL 113     Results from last 7 days   Lab Units 10/25/23  0804   INR  0.98         Results from last 7 days   Lab Units 10/25/23  0804   HEMOGLOBIN A1C % 5.6               * I Have Reviewed All Lab Data Listed Above. * Additional Pertinent Lab Tests Reviewed: All Labs For Current Hospital Admission Reviewed    Imaging:    MRI brain wo contrast    Result Date: 10/26/2023  Impression: 1. Questionable punctate focus of mild diffusion restriction at the high left parietal cortex suspicious for an acute to subacute infarct. 2. Punctate signal abnormality within the lateral aspect of the left lentiform nucleus likely related to prior ischemic injury. 3. Minimal chronic microvascular changes adjacent to the frontal horns. Workstation performed: [id number]     CT head wo contrast    Result Date: 10/26/2023  Impression: No acute intracranial abnormality. Chronic microangiopathic ischemic changes. Workstation performed: [id number]     CT stroke alert brain    Result Date: 10/25/2023  Impression: No acute intracranial abnormality. Chronic microangiopathic ischemic changes. Findings were directly discussed with Dr. Luna Dave at 8:45 a.m. Workstation performed: [id number]     CTA stroke alert (head/neck)    Result Date: 10/25/2023  Impression: 1. CTA head: Negative for large vessel intracranial occlusion or hemodynamically significant stenosis. 2. CTA neck:  No extracranial carotid stenosis. The cervical vertebral arteries are patent. Findings were directly discussed with Dr. Luna Dave at 8:45 a.m.  Workstation performed: [id number]        Recent Cultures (last 7 days):           Last 24 Hours Medication List:   Current Facility-Administered Medications   Medication Dose Route Frequency Provider Last Rate    acetaminophen  650 mg Oral Q4H PRN Lainey Rose PA-C      albuterol  1 puff Inhalation Q6H PRN Lainey Rose PA-C      atorvastatin  40 mg Oral QPM Lainey Rose PA-C      chlorhexidine  15 mL Mouth/Throat Q12H 66[address] Isamar Cobian PA-C      clonazePAM  1 mg Oral BID PRN Jarrell Tolentino MD      cyclobenzaprine  5 mg Oral TID PRN Iverson Current, PA-C      gabapentin  300 mg Oral TID Iverson Current, PA-C      heparin (porcine)  5,000 Units Subcutaneous [facility] Brandon Galarza MD      influenza vaccine  0.5 mL Intramuscular Prior to discharge Iverson Current, PA-C      lamoTRIgine  200 mg Oral Daily Iverson Current, PA-C      lidocaine  2 patch Topical Daily Iverson Current, PA-C      meclizine  25 mg Oral Q8H [address] Jarrell Tolentino MD      nicotine  1 patch Transdermal Daily Iverson Current, PA-C      OLANZapine  5 mg Oral HS Iverson Current, PA-C      ondansetron  4 mg Intravenous Q4H PRN Iverson Current, PA-C      topiramate  100 mg Oral HS Iverson Current, PA-C          Today, Patient Was Seen By: Jarrell Tolentino MD    ** Please Note: Dictation voice to text software may have been used in the creation of this document.  **

## 2023-10-27 NOTE — ASSESSMENT & PLAN NOTE
Presented with acute onset of dizziness  Patient was a stroke alert on admission, s/p TNK and monitored in ICU  CTA head negative for LVO/significant stenosis, CTA was negative  Repeat CT head s/p TNK without hemorrhage  2D echo showing EF 55%, G1 DD  MRI showing possible acute infarct, with neurology, suspect likely artifact  Does not require aspirin or statin at this time  Patient attempted to ambulate today, significantly dizzy with room spinning suspect likely BPPV  Orthostatic vitals were negative  Continue meclizine 25 mg 3 times daily, Klonopin.   Needed for anxiety/dizziness  PT and OT recommend home with outpatient therapy, would also benefit with vestibular therapy and ENT referral  Hopeful for discharge home tomorrow

## 2023-10-27 NOTE — PLAN OF CARE
Problem: Nutrition/Hydration-ADULT  Goal: Nutrient/Hydration intake appropriate for improving, restoring or maintaining nutritional needs  Description: Monitor and assess patient's nutrition/hydration status for malnutrition. Collaborate with interdisciplinary team and initiate plan and interventions as ordered. Monitor patient's weight and dietary intake as ordered or per policy. Utilize nutrition screening tool and intervene as necessary. Determine patient's food preferences and provide high-protein, high-caloric foods as appropriate. INTERVENTIONS:  - Monitor oral intake, urinary output, labs, and treatment plans  - Assess nutrition and hydration status and recommend course of action  - Evaluate amount of meals eaten  - Assist patient with eating if necessary   - Allow adequate time for meals  - Recommend/ encourage appropriate diets, oral nutritional supplements, and vitamin/mineral supplements  - Order, calculate, and assess calorie counts as needed  - Recommend, monitor, and adjust tube feedings and TPN/PPN based on assessed needs  - Assess need for intravenous fluids  - Provide specific nutrition/hydration education as appropriate  - Include patient/family/caregiver in decisions related to nutrition  Outcome: Progressing     Problem: Neurological Deficit  Goal: Neurological status is stable or improving  Description: Interventions:  - Monitor and assess patient's level of consciousness, motor function, sensory function, and level of assistance needed for ADLs. - Monitor and report changes from baseline. Collaborate with interdisciplinary team to initiate plan and implement interventions as ordered. - Provide and maintain a safe environment. - Consider seizure precautions. - Consider fall precautions. - Consider aspiration precautions. - Consider bleeding precautions. Outcome: Progressing     Problem:  Activity Intolerance/Impaired Mobility  Goal: Mobility/activity is maintained at optimum level for patient  Description: Interventions:  - Assess and monitor patient  barriers to mobility and need for assistive/adaptive devices. - Assess patient's emotional response to limitations. - Collaborate with interdisciplinary team and initiate plans and interventions as ordered. - Encourage independent activity per ability.  - Maintain proper body alignment. - Perform active/passive rom as tolerated/ordered. - Plan activities to conserve energy.  - Turn patient as appropriate  Outcome: Progressing     Problem: Communication Impairment  Goal: Ability to express needs and understand communication  Description: Assess patient's communication skills and ability to understand information. Patient will demonstrate use of effective communication techniques, alternative methods of communication and understanding even if not able to speak. - Encourage communication and provide alternate methods of communication as needed. - Collaborate with case management/ for discharge needs. - Include patient/family/caregiver in decisions related to communication. Outcome: Progressing     Problem: Potential for Aspiration  Goal: Non-ventilated patient's risk of aspiration is minimized  Description: Assess and monitor vital signs, respiratory status, and labs (WBC). Monitor for signs of aspiration (tachypnea, cough, rales, wheezing, cyanosis, fever). - Assess and monitor patient's ability to swallow. - Place patient up in chair to eat if possible. - HOB up at 90 degrees to eat if unable to get patient up into chair.  - Supervise patient during oral intake. - Instruct patient/ family to take small bites. - Instruct patient/ family to take small single sips when taking liquids.   - Follow patient-specific strategies generated by speech pathologist.  Outcome: Progressing     Problem: Nutrition  Goal: Nutrition/Hydration status is improving  Description: Monitor and assess patient's nutrition/hydration status for malnutrition (ex- brittle hair, bruises, dry skin, pale skin and conjunctiva, muscle wasting, smooth red tongue, and disorientation). Collaborate with interdisciplinary team and initiate plan and interventions as ordered. Monitor patient's weight and dietary intake as ordered or per policy. Utilize nutrition screening tool and intervene per policy. Determine patient's food preferences and provide high-protein, high-caloric foods as appropriate. - Assist patient with eating.  - Allow adequate time for meals.  - Encourage patient to take dietary supplement as ordered. - Collaborate with clinical nutritionist.  - Include patient/family/caregiver in decisions related to nutrition.   Outcome: Progressing     Problem: COPING  Goal: Pt/Family able to verbalize concerns and demonstrate effective coping strategies  Description: INTERVENTIONS:  - Assist patient/family to identify coping skills, available support systems and cultural and spiritual values  - Provide emotional support, including active listening and acknowledgement of concerns of patient and caregivers  - Reduce environmental stimuli, as able  - Provide patient education  - Assess for spiritual pain/suffering and initiate spiritual care, including notification of Pastoral Care or ronny based community as needed  - Assess effectiveness of coping strategies  Outcome: Progressing  Goal: Will report anxiety at manageable levels  Description: INTERVENTIONS:  - Administer medication as ordered  - Teach and encourage coping skills  - Provide emotional support  - Assess patient/family for anxiety and ability to cope  Outcome: Progressing     Problem: NEUROSENSORY - ADULT  Goal: Achieves stable or improved neurological status  Description: INTERVENTIONS  - Monitor and report changes in neurological status  - Monitor vital signs such as temperature, blood pressure, glucose, and any other labs ordered   - Initiate measures to prevent increased intracranial pressure  - Monitor for seizure activity and implement precautions if appropriate      Outcome: Progressing     Problem: PAIN - ADULT  Goal: Verbalizes/displays adequate comfort level or baseline comfort level  Description: Interventions:  - Encourage patient to monitor pain and request assistance  - Assess pain using appropriate pain scale  - Administer analgesics based on type and severity of pain and evaluate response  - Implement non-pharmacological measures as appropriate and evaluate response  - Consider cultural and social influences on pain and pain management  - Notify physician/advanced practitioner if interventions unsuccessful or patient reports new pain  Outcome: Progressing     Problem: INFECTION - ADULT  Goal: Absence or prevention of progression during hospitalization  Description: INTERVENTIONS:  - Assess and monitor for signs and symptoms of infection  - Monitor lab/diagnostic results  - Monitor all insertion sites, i.e. indwelling lines, tubes, and drains  - Monitor endotracheal if appropriate and nasal secretions for changes in amount and color  - [place] appropriate cooling/warming therapies per order  - Administer medications as ordered  - Instruct and encourage patient and family to use good hand hygiene technique  - Identify and instruct in appropriate isolation precautions for identified infection/condition  Outcome: Progressing     Problem: SAFETY ADULT  Goal: Patient will remain free of falls  Description: INTERVENTIONS:  - Educate patient/family on patient safety including physical limitations  - Instruct patient to call for assistance with activity   - Consult OT/PT to assist with strengthening/mobility   - Keep Call bell within reach  - Keep bed low and locked with side rails adjusted as appropriate  - Keep care items and personal belongings within reach  - Initiate and maintain comfort rounds  - Make Fall Risk Sign visible to staff  - Apply yellow socks and bracelet for high fall risk patients  - Consider moving patient to room near nurses station  Outcome: Progressing  Goal: Maintain or return to baseline ADL function  Description: INTERVENTIONS:  -  Assess patient's ability to carry out ADLs; assess patient's baseline for ADL function and identify physical deficits which impact ability to perform ADLs (bathing, care of mouth/teeth, toileting, grooming, dressing, etc.)  - Assess/evaluate cause of self-care deficits   - Assess range of motion  - Assess patient's mobility; develop plan if impaired  - Assess patient's need for assistive devices and provide as appropriate  - Encourage maximum independence but intervene and supervise when necessary  - Involve family in performance of ADLs  - Assess for home care needs following discharge   - Consider OT consult to assist with ADL evaluation and planning for discharge  - Provide patient education as appropriate  Outcome: Progressing  Goal: Maintains/Returns to pre admission functional level  Description: INTERVENTIONS:  - Perform BMAT or MOVE assessment daily.   - Set and communicate daily mobility goal to care team and patient/family/caregiver.    - Collaborate with rehabilitation services on mobility goals if consulted  - Out of bed for toileting  - Record patient progress and toleration of activity level   Outcome: Progressing     Problem: DISCHARGE PLANNING  Goal: Discharge to home or other facility with appropriate resources  Description: INTERVENTIONS:  - Identify barriers to discharge w/patient and caregiver  - Arrange for needed discharge resources and transportation as appropriate  - Identify discharge learning needs (meds, wound care, etc.)  - Arrange for interpretive services to assist at discharge as needed  - Refer to Case Management Department for coordinating discharge planning if the patient needs post-hospital services based on physician/advanced practitioner order or complex needs related to functional status, cognitive ability, or social support system  Outcome: Progressing     Problem: Knowledge Deficit  Goal: Patient/family/caregiver demonstrates understanding of disease process, treatment plan, medications, and discharge instructions  Description: Complete learning assessment and assess knowledge base. Interventions:  - Provide teaching at level of understanding  - Provide teaching via preferred learning methods  Outcome: Progressing     Problem: MOBILITY - ADULT  Goal: Maintain or return to baseline ADL function  Description: INTERVENTIONS:  -  Assess patient's ability to carry out ADLs; assess patient's baseline for ADL function and identify physical deficits which impact ability to perform ADLs (bathing, care of mouth/teeth, toileting, grooming, dressing, etc.)  - Assess/evaluate cause of self-care deficits   - Assess range of motion  - Assess patient's mobility; develop plan if impaired  - Assess patient's need for assistive devices and provide as appropriate  - Encourage maximum independence but intervene and supervise when necessary  - Involve family in performance of ADLs  - Assess for home care needs following discharge   - Consider OT consult to assist with ADL evaluation and planning for discharge  - Provide patient education as appropriate  Outcome: Progressing  Goal: Maintains/Returns to pre admission functional level  Description: INTERVENTIONS:  - Perform BMAT or MOVE assessment daily.   - Set and communicate daily mobility goal to care team and patient/family/caregiver.    - Collaborate with rehabilitation services on mobility goals if consulted  - Out of bed for toileting  - Record patient progress and toleration of activity level   Outcome: Progressing     Problem: Prexisting or High Potential for Compromised Skin Integrity  Goal: Skin integrity is maintained or improved  Description: INTERVENTIONS:  - Identify patients at risk for skin breakdown  - Assess and monitor skin integrity  - Assess and monitor nutrition and hydration status  - Monitor labs   - Assess for incontinence   - Turn and reposition patient  - Assist with mobility/ambulation  - Relieve pressure over bony prominences  - Avoid friction and shearing  - Provide appropriate hygiene as needed including keeping skin clean and dry  - Evaluate need for skin moisturizer/barrier cream  - Collaborate with interdisciplinary team   - Patient/family teaching  - Consider wound care consult   Outcome: Progressing

## 2023-10-27 NOTE — QUICK NOTE
Karen Villavicencio is a 59 y.o. female  who presented to [facility] ED on 10/25/2023 as a stroke alert with dizziness. BP on presentation to /82. Minimal improvement in dizziness with IV Valium given in the ED. NIHSS of 2 for chronic left lower facial weakness. TNK given and admitted to ICU for ongoing management/stroke work-up     Work-up:  - CT head: Unremarkable for acute intracranial abnormalities   - CTA head and neck: Unremarkable for large vessel occlusion/critical stenosis  - MRI: Questionable punctate focus of mild diffusion restriction at the high left parietal cortex suspicious for an acute to subacute infarct. Punctate signal abnormality within the lateral aspect of the left lentiform nucleus likely related to prior ischemic injury. Minimal chronic microvascular changes adjacent to the frontal horns. - 24hr CTH: No acute intracranial abnormality. Chronic microangiopathic ischemic changes. - Echo: EF 55%, wall motion normal, bilateral atrium size WNL  - Lipid panel: Total cholesterol 167, triglycerides 53, HDL 56,   - Hemoglobin A1c: 5.6    Recommendations:  MRI personally reviewed with radiologist and attending neurologist. Left parietal finding appears to be artifact with no clear ADC or flare correlate; left lentiform finding is nonspecific.   With no clear evidence of acute or chronic stroke, there is no indication for asa/statin from a neurology standpoint  No additional inpatient neurology recommendations; can follow up with outpatient neurology (general attending or APC)  in 6-8 weeks

## 2023-10-28 VITALS
TEMPERATURE: 98.1 F | HEART RATE: 98 BPM | WEIGHT: 187.39 LBS | SYSTOLIC BLOOD PRESSURE: 122 MMHG | HEIGHT: 63 IN | BODY MASS INDEX: 33.2 KG/M2 | DIASTOLIC BLOOD PRESSURE: 79 MMHG | OXYGEN SATURATION: 94 % | RESPIRATION RATE: 18 BRPM

## 2023-10-28 LAB
ALBUMIN SERPL BCP-MCNC: 4.3 G/DL (ref 3.5–5)
ALP SERPL-CCNC: 172 U/L (ref 34–104)
ALT SERPL W P-5'-P-CCNC: 32 U/L (ref 7–52)
ANION GAP SERPL CALCULATED.3IONS-SCNC: 10 MMOL/L
AST SERPL W P-5'-P-CCNC: 25 U/L (ref 13–39)
BASOPHILS # BLD AUTO: 0.07 THOUSANDS/ÂΜL (ref 0–0.1)
BASOPHILS NFR BLD AUTO: 1 % (ref 0–1)
BILIRUB SERPL-MCNC: 0.44 MG/DL (ref 0.2–1)
BUN SERPL-MCNC: 17 MG/DL (ref 5–25)
CALCIUM SERPL-MCNC: 9.5 MG/DL (ref 8.4–10.2)
CHLORIDE SERPL-SCNC: 106 MMOL/L (ref 96–108)
CO2 SERPL-SCNC: 21 MMOL/L (ref 21–32)
CREAT SERPL-MCNC: 0.81 MG/DL (ref 0.6–1.3)
EOSINOPHIL # BLD AUTO: 0.39 THOUSAND/ÂΜL (ref 0–0.61)
EOSINOPHIL NFR BLD AUTO: 4 % (ref 0–6)
ERYTHROCYTE [DISTWIDTH] IN BLOOD BY AUTOMATED COUNT: 11.7 % (ref 11.6–15.1)
GFR SERPL CREATININE-BSD FRML MDRD: 79 ML/MIN/1.73SQ M
GLUCOSE SERPL-MCNC: 130 MG/DL (ref 65–140)
HCT VFR BLD AUTO: 39.2 % (ref 34.8–46.1)
HGB BLD-MCNC: 13.4 G/DL (ref 11.5–15.4)
IMM GRANULOCYTES # BLD AUTO: 0.14 THOUSAND/UL (ref 0–0.2)
IMM GRANULOCYTES NFR BLD AUTO: 1 % (ref 0–2)
LYMPHOCYTES # BLD AUTO: 2.86 THOUSANDS/ÂΜL (ref 0.6–4.47)
LYMPHOCYTES NFR BLD AUTO: 26 % (ref 14–44)
MAGNESIUM SERPL-MCNC: 2.1 MG/DL (ref 1.9–2.7)
MCH RBC QN AUTO: 33.9 PG (ref 26.8–34.3)
MCHC RBC AUTO-ENTMCNC: 34.2 G/DL (ref 31.4–37.4)
MCV RBC AUTO: 99 FL (ref 82–98)
MONOCYTES # BLD AUTO: 0.95 THOUSAND/ÂΜL (ref 0.17–1.22)
MONOCYTES NFR BLD AUTO: 9 % (ref 4–12)
NEUTROPHILS # BLD AUTO: 6.72 THOUSANDS/ÂΜL (ref 1.85–7.62)
NEUTS SEG NFR BLD AUTO: 59 % (ref 43–75)
NRBC BLD AUTO-RTO: 0 /100 WBCS
PLATELET # BLD AUTO: 306 THOUSANDS/UL (ref 149–390)
PMV BLD AUTO: 11.1 FL (ref 8.9–12.7)
POTASSIUM SERPL-SCNC: 3.5 MMOL/L (ref 3.5–5.3)
PROT SERPL-MCNC: 7.4 G/DL (ref 6.4–8.4)
RBC # BLD AUTO: 3.95 MILLION/UL (ref 3.81–5.12)
SODIUM SERPL-SCNC: 137 MMOL/L (ref 135–147)
WBC # BLD AUTO: 11.13 THOUSAND/UL (ref 4.31–10.16)

## 2023-10-28 PROCEDURE — 85025 COMPLETE CBC W/AUTO DIFF WBC: CPT | Performed by: STUDENT IN AN ORGANIZED HEALTH CARE EDUCATION/TRAINING PROGRAM

## 2023-10-28 PROCEDURE — 80053 COMPREHEN METABOLIC PANEL: CPT | Performed by: STUDENT IN AN ORGANIZED HEALTH CARE EDUCATION/TRAINING PROGRAM

## 2023-10-28 PROCEDURE — 83735 ASSAY OF MAGNESIUM: CPT | Performed by: STUDENT IN AN ORGANIZED HEALTH CARE EDUCATION/TRAINING PROGRAM

## 2023-10-28 PROCEDURE — 99239 HOSP IP/OBS DSCHRG MGMT >30: CPT | Performed by: STUDENT IN AN ORGANIZED HEALTH CARE EDUCATION/TRAINING PROGRAM

## 2023-10-28 RX ORDER — MECLIZINE HYDROCHLORIDE 25 MG/1
25 TABLET ORAL EVERY 8 HOURS SCHEDULED
Qty: 30 TABLET | Refills: 0 | Status: SHIPPED | OUTPATIENT
Start: 2023-10-28

## 2023-10-28 RX ORDER — GABAPENTIN 300 MG/1
300 CAPSULE ORAL 2 TIMES DAILY
Refills: 0
Start: 2023-10-28

## 2023-10-28 RX ADMIN — NICOTINE 1 PATCH: 7 PATCH, EXTENDED RELEASE TRANSDERMAL at 08:56

## 2023-10-28 RX ADMIN — GABAPENTIN 300 MG: 300 CAPSULE ORAL at 08:55

## 2023-10-28 RX ADMIN — LAMOTRIGINE 200 MG: 100 TABLET ORAL at 08:55

## 2023-10-28 RX ADMIN — LIDOCAINE 2 PATCH: 50 PATCH CUTANEOUS at 08:55

## 2023-10-28 RX ADMIN — MECLIZINE HYDROCHLORIDE 25 MG: 25 TABLET ORAL at 05:13

## 2023-10-28 RX ADMIN — HEPARIN SODIUM 5000 UNITS: 5000 INJECTION INTRAVENOUS; SUBCUTANEOUS at 05:13

## 2023-10-28 NOTE — PLAN OF CARE
Problem: PAIN - ADULT  Goal: Verbalizes/displays adequate comfort level or baseline comfort level  Description: Interventions:  - Encourage patient to monitor pain and request assistance  - Assess pain using appropriate pain scale  - Administer analgesics based on type and severity of pain and evaluate response  - Implement non-pharmacological measures as appropriate and evaluate response  - Consider cultural and social influences on pain and pain management  - Notify physician/advanced practitioner if interventions unsuccessful or patient reports new pain  Outcome: Progressing     Problem: SAFETY ADULT  Goal: Patient will remain free of falls  Description: INTERVENTIONS:  - Educate patient/family on patient safety including physical limitations  - Instruct patient to call for assistance with activity   - Consult OT/PT to assist with strengthening/mobility   - Keep Call bell within reach  - Keep bed low and locked with side rails adjusted as appropriate  - Keep care items and personal belongings within reach  - Initiate and maintain comfort rounds  - Make Fall Risk Sign visible to staff  - Offer Toileting every 2 Hours, in advance of need  - Initiate/Maintain bed alarm  - Obtain necessary fall risk management equipment: call bell  - Apply yellow socks and bracelet for high fall risk patients  - Consider moving patient to room near nurses station  Outcome: Progressing

## 2023-10-28 NOTE — DISCHARGE SUMMARY
[address]  Discharge- Francisco Fonseca 1965, 59 y.o. female MRN: [id number]  Unit/Bed#: -01 Encounter: [id number]  Primary Care Provider: Db Enriquez MD   Date and time admitted to hospital: 10/25/2023  7:35 AM    * Dizziness  Assessment & Plan  Presented with acute onset of dizziness, stroke alert s/p TNK and monitored in ICU  CTA head negative for LVO/significant stenosis, CTA was negative  Repeat CT head s/p TNK without hemorrhage  2D echo showing EF 55%, G1 DD  MRI showing possible small acute infarct; per neurology it is artifact, no indication for aspirin/statin. Orthostatic vitals were negative    10/27 Continues reports some dizziness  10/28 was able to stand and walk in the room independently, talking and turning head R/L without exacerbation of her dizziness. Impression: stroke workup & per neurology eval was negative ; TPA w/o complications; differentials of the reported subjective dizziness include orthostatic hypotension, vascular depletion/dehydration. Patient reported drinks ~ 1 bottle 20 oz of water a day, dry oral mucosa on exam.      PLAN   - Continue meclizine 25 mg 3 times daily x 1 week.   - Decrease home gabapentin 300 mg to BID not TID   - counseled on avoiding additional anxiolytics/opioids/antihistaminic that may cause dizziness; currently on Klonopin for depression/anxiety; established and follows up with psych outpatient   - referral to outpatient rehab and vestibular therapy   - counseled on increasing PO intake and well hydration.   - f/u with PCP with in 1-2 weeks of discharge             Bipolar disorder (HCC)  Assessment & Plan  Continue Zyprexa 5 mg at bedtime    Depression with anxiety  Assessment & Plan  Continue Klonopin twice daily as needed for anxiety  PDMP reviewed    Asthma  Assessment & Plan  Not in exacerbation, albuterol as needed    Bell's palsy  Assessment & Plan  With residual left-sided facial weakness        Medical Problems Resolved Problems  Date Reviewed: 10/28/2023   None       Discharging Physician / Practitioner: Simin Almonte DO  PCP: Marta Lombard, MD  Admission Date:   Admission Orders (From admission, onward)       Ordered        10/25/23 0931  INPATIENT ADMISSION  Once                          Discharge Date: 10/29/23    Consultations During Hospital Stay:  PT/OT    Procedures Performed:   none    Significant Findings / Test Results: Incidental Findings:   Suspected small infarct on MRI but per neuro eval it is likely artifact  Dry oral mucosa and decreased PO fluid intake     Test Results Pending at Discharge (will require follow up):   none     Outpatient Tests Requested:  Vestibular rehab     Complications:  none    Reason for Admission: dizziness    Hospital Course:   Elvia Hdez is a 59 y.o. female patient who originally presented to the hospital on 10/25/2023 due to dizziness, stroke alert on admission, for which given tPA, MRI remarkable for possible small artifact however neurology think it is artifact, otherwise stroke work-up unremarkable, no indication for aspirin or statin, 10/28 relatively improved standing and walking independently noting feeling better at that time, explained above, dry oral mucosa noted, reports limited oral fluid intake only about 20 hours a day, explained above assessment and plan in details, patient verbalized understanding and agreement, will follow-up closely with her PCP and psych on discharge referral to physical therapy per PT OT recommendations and referral for vestibular rehab in place, will trial meclizine for a week till further recovery and improvement. Please see above list of diagnoses and related plan for additional information. Condition at Discharge: stable    Discharge Day Visit / Exam:   Subjective:   - GENERAL: Negative for any nausea, vomiting, fevers, chills, or weight loss.   - HEENT: Negative for any head/Neck trauma, pain, double/blurry vision, sinusitis, rhinitis, nose bleeding.  - CARDIAC: Negative for any chest pain, palpitation, Dyspnea on exertion, peripheral edema. - PULMONARY: Negative for any SOB, cough, wheezing.   - GASTROINTESTINAL: Negative for any abdominal pain, N/V/D/C, blood in stool.   - GENITOURINARY: Negative for any dysuria, hematuria, incontinence.  - NEUROLOGIC: Negative for any muscle weakness, numbness/tingling, memory changes. - MUSCULOSKELETAL: Negative for any joint pains/swelling, limited ROM. - INTEGUMENTARY: Negative for any rashes, cuts/ lesions.  - HEMATOLOGIC: Negative for any abnormal bruising, frequent infections or bleeding. Vitals: Blood Pressure: 122/79 (10/28/23 1100)  Pulse: 98 (10/28/23 1100)  Temperature: 98.1 °F (36.7 °C) (10/28/23 0744)  Temp Source: Oral (10/28/23 0300)  Respirations: 18 (10/28/23 1100)  Height: 5' 3" (160 cm) (10/25/23 1400)  Weight - Scale: 85 kg (187 lb 6.3 oz) (10/28/23 0600)  SpO2: 94 % (10/28/23 1100)  Exam:   Physical Exam   - GEN: Appears well, alert and oriented x 3, pleasant and cooperative, in no acute distress  - HEENT: Anicteric, mucous membranes moist, PERRL and EOMI   - NECK: No lymphadenopathy, JVD or carotid bruits   - HEART: RRR, normal S1 and S2, no murmurs, clicks, gallops or rubs   - LUNGS: Clear to auscultation bilaterally; no wheezes, rales, or rhonchi  - ABDOMEN: Normal bowel sounds, soft, no tenderness, no distention, no organomegaly or masses felt on exam.   - EXTREMITIES: Peripheral pulses normal; no clubbing, cyanosis, or edema  - NEURO: No focal findings, CN II-XII are grossly intact. - Musculoskeletal: 5/5 strength, normal ROM, no swollen or erythematous joints. - SKIN: Normal without suspicious lesions on exposed skin      Discussion with Family: Patient declined call to . Discharge instructions/Information to patient and family:   See after visit summary for information provided to patient and family.       Provisions for Follow-Up Care:  See after visit summary for information related to follow-up care and any pertinent home health orders. Disposition:   Home    Planned Readmission: no     Discharge Statement:  I spent 50 minutes discharging the patient. This time was spent on the day of discharge. I had direct contact with the patient on the day of discharge. Greater than 50% of the total time was spent examining patient, answering all patient questions, arranging and discussing plan of care with patient as well as directly providing post-discharge instructions. Additional time then spent on discharge activities. Discharge Medications:  See after visit summary for reconciled discharge medications provided to patient and/or family.       **Please Note: This note may have been constructed using a voice recognition system**

## 2023-10-28 NOTE — DISCHARGE INSTR - AVS FIRST PAGE
Decrease GEOVANNA to 300 twice daily instead of three times daily and follow up further with PCP (recommended with in a week of discharge)   Avoid muscle relaxant e.g. FLEXERIL or Opioids as possible as may contribute to dizziness / light headness   Referral to Vestibular Rehab outpatient   Trial Meclizine 25 mg every 8 hours   Increase hydration to ~ 2 L water daily

## 2023-10-28 NOTE — CASE MANAGEMENT
Case Management Assessment & Discharge Planning Note    Patient name Mustapha Davis  Location [address] [facility] [id number] MRN [id number]  : 1965 Date 10/28/2023       Current Admission Date: 10/25/2023  Current Admission Diagnosis:Dizziness   Patient Active Problem List    Diagnosis Date Noted    Dizziness 10/25/2023    Bell's palsy 10/25/2023    Migraines 10/25/2023    Asthma 10/25/2023    Depression with anxiety 10/25/2023    Bipolar disorder ([address] [facility]) 10/25/2023      LOS (days): 3  Geometric Mean LOS (GMLOS) (days): 1.90  Days to GMLOS:-1.2     OBJECTIVE:    Risk of Unplanned Readmission Score: 8.39         Current admission status: Inpatient  Referral Reason: Stroke    Preferred Pharmacy:   [facility] [address] NIKOLAS HINTON [address]  Phone: [phone] Fax: [phone]    Primary Care Provider: Darlin Reese MD    Primary Insurance: [facility]  Secondary Insurance:     ASSESSMENT:  Jatinder Proxies    There are no active Health Care Proxies on file.                  Readmission Root Cause  30 Day Readmission: No    Patient Information  Admitted from[de-identified] Home  Mental Status: Alert  During Assessment patient was accompanied by: Not accompanied during assessment  Assessment information provided by[de-identified] Patient  Primary Caregiver: Self  Support Systems: Son, Daughter, [address] of Residence: [facility] do you live in?: [address] [place] [address] of Daily Living Prior to Admission  Functional Status: Independent  Completes ADLs independently?: Yes  Ambulates independently?: Yes  Does patient use assisted devices?: No  Does patient currently own DME?: No  Does patient have a history of Outpatient Therapy (PT/OT)?: No  Does the patient have a history of Short-Term Rehab?: No  Does patient have a history of HHC?: No  Does patient currently have [address]?: No                   Means of Transportation  Means of Transport to [facility][de-identified] Drives Self  In the past 12 months, has lack of transportation kept you from medical appointments or from getting medications?: No  In the past 12 months, has lack of transportation kept you from meetings, work, or from getting things needed for daily living?: No  Was application for public transport provided?: N/A        DISCHARGE DETAILS:    Discharge planning discussed with[de-identified] patient  Freedom of Choice: Yes  Comments - Freedom of Choice: introduced self and role. discussed outpatient rehab recommendation with patient who is in agreement with plan. Discussed the facilities that are closest to her home that also provide vestibular therapy that was recommended by medical team. Patient verbalized understanding, and had no questions. OP PT list with closet facilities that have VT highlighted given to patient. CM contacted family/caregiver?: No- see comments (patient A&O, declined)  Were Treatment Team discharge recommendations reviewed with patient/caregiver?: Yes  Did patient/caregiver verbalize understanding of patient care needs?: Yes  Were patient/caregiver advised of the risks associated with not following Treatment Team discharge recommendations?: Yes         [address]         Is the patient interested in [address] [facility] at discharge?: No         Other Referral/Resources/Interventions Provided:  Interventions: Outpatient PT  Referral Comments: Discussed the facilities that are closest to her home that also provide vestibular therapy that was recommended by medical team. Patient verbalized understanding, and had no questions. OP PT list with closet facilities that have VT highlighted given to patient.     Would you like to participate in our [address] service program?  : No - Declined    Treatment Team Recommendation: Home  Discharge Destination Plan[de-identified] Home  Transport at Discharge : Family

## 2023-10-28 NOTE — NURSING NOTE
Discharge instruction given to patient, patient verbalized understanding. Went home fair per wheelchair with her belongings.

## 2023-10-28 NOTE — UTILIZATION REVIEW
Continued Stay Review    Date:  10/26/23                          Current Patient Class:   Inpatient  Current Level of Care:  med surg    HPI:58 y.o. female initially admitted on   10/25  with   dizziness    Assessment/Plan:   10/26   Continue PT/OT. Wait  MRI  brain. Remains on tele. Continue frequent neuro checks. Needs trict  BP control. Dizziness not as severe as  admission. Does get worse with standing or ambulating.     Vital Signs:   10/26/23 1600 -- 70 17 95/50 70 92 % -- --   10/26/23 1500 98 °F (36.7 °C) 70 16 115/60 82 92 % -- --   10/26/23 1200 -- 64 28 Abnormal  109/60 80 95 % -- --   10/26/23 1100 -- 65 13 104/53 75 89 % Abnormal  -- --   10/26/23 1000 -- 75 12 111/61 81 89 % Abnormal  -- --   10/26/23 0900 -- 66 26 Abnormal  104/76 86 93 % -- --   10/26/23 0800 -- 58 13 93/50 67 91 % None (Room air) --   10/26/23 0700 97.5 °F (36.4 °C) 57 14 121/60 86 92 % -- --   10/26/23 0600 -- 59 12 103/60 75 91 % -- --   10/26/23 0500 -- 62 15 98/56 74 92 % -- --   10/26/23 0400 -- 68 24 Abnormal  103/58 75 93 % -- --   10/26/23 0300 -- 69 21 101/56 76 94 % -- --   10/26/23 0221 97.7 °F (36.5 °C) 65 15 96/57 69 92 % -- Lying   10/26/23 0200 -- 67 15 96/57 69 92 % -- --   10/26/23 0100 -- 71 16 102/58 75 93 % -- --   10/26/23 0000 -- 71 16 102/63 77 93 % --        Pertinent Labs/Diagnostic Results:   Results from last 7 days   Lab Units 10/25/23  0810   SARS-COV-2  Negative     Results from last 7 days   Lab Units 10/28/23  0429 10/27/23  0531 10/26/23  0932 10/25/23  0804   WBC Thousand/uL 11.13* 10.19* 7.91 8.33   HEMOGLOBIN g/dL 13.4 14.1 13.2 13.2   HEMATOCRIT % 39.2 42.8 40.3 39.0   PLATELETS Thousands/uL 306 271 273 286   NEUTROS ABS Thousands/µL 6.72 6.10  --   --          Results from last 7 days   Lab Units 10/28/23  0429 10/27/23  0640 10/26/23  0932 10/25/23  0804   SODIUM mmol/L 137 139 141 141   POTASSIUM mmol/L 3.5 4.2 3.6 4.6   CHLORIDE mmol/L 106 106 106 108   CO2 mmol/L 21 25 28 24   ANION GAP mmol/L 10 8 7 9   BUN mg/dL 17 18 18 17   CREATININE mg/dL 0.81 0.91 0.95 0.80   EGFR ml/min/1.73sq m 79 68 65 80   CALCIUM mg/dL 9.5 9.7 10.0 9.6   MAGNESIUM mg/dL 2.1 2.3  --   --      Results from last 7 days   Lab Units 10/28/23  0429   AST U/L 25   ALT U/L 32   ALK PHOS U/L 172*   TOTAL PROTEIN g/dL 7.4   ALBUMIN g/dL 4.3   TOTAL BILIRUBIN mg/dL 0.44         Results from last 7 days   Lab Units 10/28/23  0429 10/27/23  0640 10/26/23  0932 10/25/23  0804   GLUCOSE RANDOM mg/dL 130 113 114 102         Results from last 7 days   Lab Units 10/25/23  0804   HEMOGLOBIN A1C % 5.6   EAG mg/dl 114           Results from last 7 days   Lab Units 10/25/23  1023 10/25/23  0804   HS TNI 0HR ng/L  --  3   HS TNI 2HR ng/L 5  --    HSTNI D2 ng/L 2  --          Results from last 7 days   Lab Units 10/25/23  0804   PROTIME seconds 13.6   INR  0.98   PTT seconds 30                                         Results from last 7 days   Lab Units 10/26/23  0932   HEP C AB  Non-reactive                         Results from last 7 days   Lab Units 10/25/23  0810   INFLUENZA A PCR  Negative   INFLUENZA B PCR  Negative   RSV PCR  Negative                                               Medications:   Scheduled Medications:  atorvastatin, 40 mg, Oral, QPM  chlorhexidine, 15 mL, Mouth/Throat, Q12H ARI  gabapentin, 300 mg, Oral, TID  heparin (porcine), 5,000 Units, Subcutaneous, Q8H ARI  lamoTRIgine, 200 mg, Oral, Daily  lidocaine, 2 patch, Topical, Daily  meclizine, 25 mg, Oral, Q8H [facility]  nicotine, 1 patch, Transdermal, Daily  OLANZapine, 5 mg, Oral, HS  topiramate, 100 mg, Oral, HS      Continuous IV Infusions:     PRN Meds:  acetaminophen, 650 mg, Oral, Q4H PRN  albuterol, 1 puff, Inhalation, Q6H PRN  clonazePAM, 1 mg, Oral, BID PRN  cyclobenzaprine, 5 mg, Oral, TID PRN  influenza vaccine, 0.5 mL, Intramuscular, Prior to discharge  ondansetron, 4 mg, Intravenous, Q4H PRN        Discharge Plan: D/C  home    10/28    Network Utilization Review Department  ATTENTION: Please call with any questions or concerns to [phone] and carefully listen to the prompts so that you are directed to the right person. All voicemails are confidential.   For Discharge needs, contact Care Management DC Support Team at [phone] opt. 2  Send all requests for admission clinical reviews, approved or denied determinations and any other requests to dedicated fax number below belonging to the campus where the patient is receiving treatment.  List of dedicated fax numbers for the Facilities:  [place] DENIALS (Administrative/Medical Necessity) [phone]   DISCHARGE SUPPORT TEAM (NETWORK) [address] [facility] (Maternity/NICU/Pediatrics) [phone]   [address] [facility] [address] 15[address] [facility] 1000 [facility] [phone]   [address] [place] [address] [address] [address] [facility] [address]0 [address] [phone]

## 2023-10-28 NOTE — ASSESSMENT & PLAN NOTE
Presented with acute onset of dizziness, stroke alert s/p TNK and monitored in ICU  CTA head negative for LVO/significant stenosis, CTA was negative  Repeat CT head s/p TNK without hemorrhage  2D echo showing EF 55%, G1 DD  MRI showing possible small acute infarct; per neurology it is artifact, no indication for aspirin/statin. Orthostatic vitals were negative    10/27 Continues reports some dizziness  10/28 was able to stand and walk in the room independently, talking and turning head R/L without exacerbation of her dizziness. Impression: stroke workup & per neurology eval was negative ; TPA w/o complications; differentials of the reported subjective dizziness include orthostatic hypotension, vascular depletion/dehydration. Patient reported drinks ~ 1 bottle 20 oz of water a day, dry oral mucosa on exam.      PLAN   - Continue meclizine 25 mg 3 times daily x 1 week.   - Decrease home gabapentin 300 mg to BID not TID   - counseled on avoiding additional anxiolytics/opioids/antihistaminic that may cause dizziness; currently on Klonopin for depression/anxiety; established and follows up with psych outpatient   - referral to outpatient rehab and vestibular therapy   - counseled on increasing PO intake and well hydration.   - f/u with PCP with in 1-2 weeks of discharge

## 2023-10-30 NOTE — UTILIZATION REVIEW
NOTIFICATION OF INPATIENT ADMISSION   AUTHORIZATION REQUEST   SERVICING FACILITY:   [address] [place], [place] [address]Riley Gonzales[address]  Tax ID: [id number]  NPI: [id number] ATTENDING PROVIDER:  Attending Name and NPI#: Christiano Guillen [[id number]]  Address: Mildred Bella[address]  Phone: [phone]     ADMISSION INFORMATION:  Place of Service: Inpatient [address] [place] [address] Code: 21  Inpatient Admission Date/Time: 10/25/23  9:31 AM  Discharge Date/Time: 10/28/2023  1:15 PM  Admitting Diagnosis Code/Description:  Dizziness [R42]  Stroke-like symptoms [R29.90]     UTILIZATION REVIEW CONTACT:  Hiram Kerr Utilization   Network Utilization Review Department  Phone: [phone]  Fax [phone]  Email: [email or web address]  Contact for approvals/pending authorizations, clinical reviews, and discharge. PHYSICIAN ADVISORY SERVICES:  Medical Necessity Denial & [employer] Review  Phone: [phone]  Fax: [phone]  Email: [email or web address]     DISCHARGE SUPPORT TEAM:  For Patients Discharge Needs & Updates  Phone: [phone] opt. 2 Fax: [phone]  Email: [email or web address]

## 2023-10-31 NOTE — UTILIZATION REVIEW
NOTIFICATION OF ADMISSION DISCHARGE   This is a Notification of Discharge from [address] [facility]. Please be advised that this patient has been discharge from our facility. Below you will find the admission and discharge date and time including the patient’s disposition. UTILIZATION REVIEW CONTACT:  Jonah Angel  Utilization   Network Utilization Review Department  Phone: [phone] x carefully listen to the prompts. All voicemails are confidential.  Email: [email or web address]     ADMISSION INFORMATION  PRESENTATION DATE: 10/25/2023  7:35 AM  OBERVATION ADMISSION DATE:   INPATIENT ADMISSION DATE: 10/25/23  9:31 AM   DISCHARGE DATE: 10/28/2023  1:15 PM   DISPOSITION:Home/Self Care    Network Utilization Review Department  ATTENTION: Please call with any questions or concerns to [phone] and carefully listen to the prompts so that you are directed to the right person. All voicemails are confidential.   For Discharge needs, contact Care Management DC Support Team at [phone] opt. 2  Send all requests for admission clinical reviews, approved or denied determinations and any other requests to dedicated fax number below belonging to the campus where the patient is receiving treatment.  List of dedicated fax numbers for the Facilities:  [place] DENIALS (Administrative/Medical Necessity) [phone]   DISCHARGE SUPPORT TEAM (Network) [phone] [address] [facility] (Maternity/NICU/Pediatrics) [phone] [address] [facility] [address] [place] [address] [address]0 [address]  [facility] [phone]

## 2023-11-17 ENCOUNTER — EVALUATION (OUTPATIENT)
Age: 58
End: 2023-11-17
Payer: COMMERCIAL

## 2023-11-17 DIAGNOSIS — H81.12 BPPV (BENIGN PAROXYSMAL POSITIONAL VERTIGO), LEFT: Primary | ICD-10-CM

## 2023-11-17 PROCEDURE — 97161 PT EVAL LOW COMPLEX 20 MIN: CPT | Performed by: PHYSICAL THERAPIST

## 2023-11-17 PROCEDURE — 95992 CANALITH REPOSITIONING PROC: CPT | Performed by: PHYSICAL THERAPIST

## 2023-11-17 NOTE — PROGRESS NOTES
PT Evaluation          POC expires Unit limit Auth Expiration date PT/OT + Visit Limit?   2023 BOMN pend BOMn                           Visit/Unit Tracking  AUTH Status:  Date               Pending visits  Used 1               Remaining                                 Today's date: 2023  Patient name: Elvia Hdez  : 1965  MRN: [id number]  Referring provider: Simin Almonte DO  Dx:   Encounter Diagnosis     ICD-10-CM    1. BPPV (benign paroxysmal positional vertigo), left  H81.12             Assessment  Assessment details: Patient is a 59 y.o. Female who presents to skilled outpatient PT with BPPV L side . Cervical spine integrity intact per normal and negative results of mVBI, Sharp Kimberlyn, and Alar Stability Tests respectively. Patient displayed L upward torisonal nystagmus with positional assessment indicating likely L Posterior Canalithiasis. L Epley Maneuver performed today 3 times today with Good results with decrease symptoms but still having nystagmus. Educated the patient on the anatomy of the inner ear, potential for residual dizziness for up to 48 hours following session, and to seek higher level of care if symptoms worsen or change and She was in good verbal understanding. She will benefit from skilled outpatient PT in order to reduce dizziness symptoms and return to PLOF. Patient verbalized understanding of POC. Please contact me if you have any questions or recommendations. Thank you for the referral and the opportunity to share in [phone] [facility].       Cut off score   All date taken from APTA Neuro Section or Rehab Measures    DGI:  MDC for Vestibular Disorders: 4 points  [facility] for Geriatrics/Community Dwelling Older Adults: 3 Points  Falls risk cut off: <19/24    FGA:  MCID: 4 points  Geriatrics/Community Dwelling Older Adults: </= 22/30 fall risk  Geriatrics/Community Dwelling Older Adults: </= 20/30 unexplained falls in the next 6 months  Parkinsons: </= 18/30 fall risk    mCTSIB (normed on ages 24-63, lower number is less sway or better static balance)  Eyes open firm surface (norm 0.21-0.48)  Eyes closed firm surface (norm 0.48-0.99)  Eyes open foam surface (norm 0.38-0.71)  Eyes closed foam surface (norm 0.70-2.22)    DHI:  0-39: low perception of handicap  40-69: moderate perception of handicap  : severe perception of handicap  > 60: increased risk for falls        Impairments: activity intolerance, impaired balance, lacks appropriate, HEP, safety issue  Understanding of Dx/Px/POC: Good  Prognosis: Good      Goals    BPPV Goals (4 weeks):  - Patient will report complete resolution of symptoms in order to promote return to PLOF  - Patient will complete FGA in order to promote return to safe performance of ADLs  - Patient will demonstrate (-) Dorie-Hallpike test on L side          Plan  Plan details:   Patient would benefit from: PT Eval  Planned therapy interventions: balance, HEP, manual therapy, neuromuscular re-education, patient education  Frequency: 1-3x per week  Plan of Care beginning date: 11/17/2023  Plan of Care expiration date: 4 weeks - 12/15/2023  Treatment plan discussed with: Patient        Subjective Evaluation    History of Present Illness  - Mechanism of injury: Pt reports having spinning on 10/25 with being in the hospital for 3 days. Notes having more symptoms on left than right.        Dizziness Subjective  - How long does dizziness last: 60 seconds   - How would you describe the dizziness: spinning sensation   - Rolling in bed: Yes  - Supine to/from sit: Yes  - Recent hearing loss: No  - Tinnitus: Yes  - Aural fullness/ear pain: No  - Vision changes: No  - History of recent viral infections: No  - History of migraines: No    Red Flag Screen  - Numbness: No  - Tingling: No  - Weakness: No  - Unilateral hearing loss: No  - Slurred speech: No  - Progressive hearing loss: No  - Tremors: No  - Poor coordination: No  - UMN signs: No  - LoC: No  - Rigidity: No  - Visual field loss: No  - Memory loss: No  - CN dysfunction: No  - Vertical nystagmus: No    Pain  Current pain ratin/10  At best pain ratin/10  At worst pain ratin/10  Location: none  Aggravating factors: none    Treatments  Previous treatment: na  Current treatment: low back treatment w WC  Diagnostic Testing: na      Objective     BPPV Objective  Integrity Testing  - mVBI: denies passing out sensation   - Sharp Kimberlyn: intact   - Alar Ligament Stability Test: intact   - Posture: moderate forward head posture      Positional Testing  - R [place]-Hallpike: negative x 2   - L Rockland-Hallpike: + left side 20 seconds upbeating torsional nystagmus   - R Roll Test: negative x 2   - L Roll Test: negative x 2      Outcome Measures Initial Eval  2023    mCTSIB  - FTEO (firm)  - FTEC (firm)  - FTEO (foam)  - FTEC (foam)   Defer    DGI Defer    FGA Defer    10 meter Defer    DHI 68/10, moderate forward head                                   Precautions: standard   Past Medical History:   Diagnosis Date    Anxiety     Asthma     Bipolar 1 disorder (HCC)     Depression     Psychiatric disorder

## 2023-12-11 ENCOUNTER — TELEPHONE (OUTPATIENT)
Dept: NEUROLOGY | Facility: CLINIC | Age: 58
End: 2023-12-11

## 2023-12-11 NOTE — TELEPHONE ENCOUNTER
1ST ATTEMPT,     Called pt no answer, LMOM.     Thank you,     Jb Morgan           [facility]/ [address] [place] [address] Drive- 10/28/2023    Can follow up with outpatient neurology (general attending or APC)  in 6-8 weeks

## 2024-07-26 ENCOUNTER — APPOINTMENT (EMERGENCY)
Dept: CT IMAGING | Facility: HOSPITAL | Age: 59
End: 2024-07-26
Payer: COMMERCIAL

## 2024-07-26 ENCOUNTER — APPOINTMENT (EMERGENCY)
Dept: RADIOLOGY | Facility: HOSPITAL | Age: 59
End: 2024-07-26
Payer: COMMERCIAL

## 2024-07-26 ENCOUNTER — HOSPITAL ENCOUNTER (EMERGENCY)
Facility: HOSPITAL | Age: 59
Discharge: HOME | End: 2024-07-26
Attending: EMERGENCY MEDICINE
Payer: COMMERCIAL

## 2024-07-26 VITALS
SYSTOLIC BLOOD PRESSURE: 146 MMHG | RESPIRATION RATE: 20 BRPM | DIASTOLIC BLOOD PRESSURE: 74 MMHG | HEART RATE: 62 BPM | TEMPERATURE: 97.6 F | OXYGEN SATURATION: 99 %

## 2024-07-26 DIAGNOSIS — R07.9 CHEST PAIN: Primary | ICD-10-CM

## 2024-07-26 LAB
2HR DELTA HS TROPONIN: >1 NG/L
ALBUMIN SERPL BCG-MCNC: 4.3 G/DL (ref 3.5–5)
ALP SERPL-CCNC: 114 U/L (ref 34–104)
ALT SERPL W P-5'-P-CCNC: 28 U/L (ref 7–52)
ANION GAP SERPL CALCULATED.3IONS-SCNC: 7 MMOL/L (ref 4–13)
AST SERPL W P-5'-P-CCNC: 26 U/L (ref 13–39)
BASOPHILS # BLD AUTO: 0.04 THOUSANDS/ΜL (ref 0–0.1)
BASOPHILS NFR BLD AUTO: 1 % (ref 0–1)
BILIRUB SERPL-MCNC: 0.59 MG/DL (ref 0.2–1)
BUN SERPL-MCNC: 18 MG/DL (ref 5–25)
CALCIUM SERPL-MCNC: 9.6 MG/DL (ref 8.4–10.2)
CARDIAC TROPONIN I PNL SERPL HS: 3 NG/L
CARDIAC TROPONIN I PNL SERPL HS: <2 NG/L
CHLORIDE SERPL-SCNC: 106 MMOL/L (ref 96–108)
CO2 SERPL-SCNC: 30 MMOL/L (ref 21–32)
CREAT SERPL-MCNC: 0.74 MG/DL (ref 0.6–1.3)
D DIMER PPP FEU-MCNC: 0.98 UG/ML FEU
EOSINOPHIL # BLD AUTO: 0.14 THOUSAND/ΜL (ref 0–0.61)
EOSINOPHIL NFR BLD AUTO: 2 % (ref 0–6)
ERYTHROCYTE [DISTWIDTH] IN BLOOD BY AUTOMATED COUNT: 12.1 % (ref 11.6–15.1)
FLUAV RNA RESP QL NAA+PROBE: NEGATIVE
FLUBV RNA RESP QL NAA+PROBE: NEGATIVE
GFR SERPL CREATININE-BSD FRML MDRD: 87 ML/MIN/1.73SQ M
GLUCOSE SERPL-MCNC: 108 MG/DL (ref 65–140)
HCT VFR BLD AUTO: 38.9 % (ref 34.8–46.1)
HGB BLD-MCNC: 13.2 G/DL (ref 11.5–15.4)
IMM GRANULOCYTES # BLD AUTO: 0.05 THOUSAND/UL (ref 0–0.2)
IMM GRANULOCYTES NFR BLD AUTO: 1 % (ref 0–2)
LIPASE SERPL-CCNC: 15 U/L (ref 11–82)
LYMPHOCYTES # BLD AUTO: 2.04 THOUSANDS/ΜL (ref 0.6–4.47)
LYMPHOCYTES NFR BLD AUTO: 24 % (ref 14–44)
MCH RBC QN AUTO: 34.3 PG (ref 26.8–34.3)
MCHC RBC AUTO-ENTMCNC: 33.9 G/DL (ref 31.4–37.4)
MCV RBC AUTO: 101 FL (ref 82–98)
MONOCYTES # BLD AUTO: 0.92 THOUSAND/ΜL (ref 0.17–1.22)
MONOCYTES NFR BLD AUTO: 11 % (ref 4–12)
NEUTROPHILS # BLD AUTO: 5.26 THOUSANDS/ΜL (ref 1.85–7.62)
NEUTS SEG NFR BLD AUTO: 61 % (ref 43–75)
NRBC BLD AUTO-RTO: 0 /100 WBCS
PLATELET # BLD AUTO: 274 THOUSANDS/UL (ref 149–390)
PMV BLD AUTO: 11.6 FL (ref 8.9–12.7)
POTASSIUM SERPL-SCNC: 3.9 MMOL/L (ref 3.5–5.3)
PROT SERPL-MCNC: 7.6 G/DL (ref 6.4–8.4)
RBC # BLD AUTO: 3.85 MILLION/UL (ref 3.81–5.12)
RSV RNA RESP QL NAA+PROBE: NEGATIVE
SARS-COV-2 RNA RESP QL NAA+PROBE: NEGATIVE
SODIUM SERPL-SCNC: 143 MMOL/L (ref 135–147)
WBC # BLD AUTO: 8.45 THOUSAND/UL (ref 4.31–10.16)

## 2024-07-26 PROCEDURE — 93005 ELECTROCARDIOGRAM TRACING: CPT

## 2024-07-26 PROCEDURE — 85379 FIBRIN DEGRADATION QUANT: CPT | Performed by: EMERGENCY MEDICINE

## 2024-07-26 PROCEDURE — 85025 COMPLETE CBC W/AUTO DIFF WBC: CPT | Performed by: EMERGENCY MEDICINE

## 2024-07-26 PROCEDURE — 83690 ASSAY OF LIPASE: CPT | Performed by: EMERGENCY MEDICINE

## 2024-07-26 PROCEDURE — 99285 EMERGENCY DEPT VISIT HI MDM: CPT | Performed by: EMERGENCY MEDICINE

## 2024-07-26 PROCEDURE — 96374 THER/PROPH/DIAG INJ IV PUSH: CPT

## 2024-07-26 PROCEDURE — 0241U HB NFCT DS VIR RESP RNA 4 TRGT: CPT | Performed by: EMERGENCY MEDICINE

## 2024-07-26 PROCEDURE — 74177 CT ABD & PELVIS W/CONTRAST: CPT

## 2024-07-26 PROCEDURE — 84484 ASSAY OF TROPONIN QUANT: CPT | Performed by: EMERGENCY MEDICINE

## 2024-07-26 PROCEDURE — 96361 HYDRATE IV INFUSION ADD-ON: CPT

## 2024-07-26 PROCEDURE — 80053 COMPREHEN METABOLIC PANEL: CPT | Performed by: EMERGENCY MEDICINE

## 2024-07-26 PROCEDURE — 71046 X-RAY EXAM CHEST 2 VIEWS: CPT

## 2024-07-26 PROCEDURE — 71275 CT ANGIOGRAPHY CHEST: CPT

## 2024-07-26 PROCEDURE — 99285 EMERGENCY DEPT VISIT HI MDM: CPT

## 2024-07-26 PROCEDURE — 96375 TX/PRO/DX INJ NEW DRUG ADDON: CPT

## 2024-07-26 PROCEDURE — 36415 COLL VENOUS BLD VENIPUNCTURE: CPT | Performed by: EMERGENCY MEDICINE

## 2024-07-26 RX ORDER — SODIUM CHLORIDE 9 MG/ML
3 INJECTION INTRAVENOUS
Status: DISCONTINUED | OUTPATIENT
Start: 2024-07-26 | End: 2024-07-26 | Stop reason: HOSPADM

## 2024-07-26 RX ORDER — KETOROLAC TROMETHAMINE 30 MG/ML
15 INJECTION, SOLUTION INTRAMUSCULAR; INTRAVENOUS ONCE
Status: COMPLETED | OUTPATIENT
Start: 2024-07-26 | End: 2024-07-26

## 2024-07-26 RX ORDER — NAPROXEN 500 MG/1
500 TABLET ORAL 2 TIMES DAILY WITH MEALS
Qty: 30 TABLET | Refills: 0 | Status: SHIPPED | OUTPATIENT
Start: 2024-07-26

## 2024-07-26 RX ORDER — DIPHENHYDRAMINE HYDROCHLORIDE 50 MG/ML
25 INJECTION INTRAMUSCULAR; INTRAVENOUS ONCE
Status: COMPLETED | OUTPATIENT
Start: 2024-07-26 | End: 2024-07-26

## 2024-07-26 RX ORDER — METOCLOPRAMIDE HYDROCHLORIDE 5 MG/ML
10 INJECTION INTRAMUSCULAR; INTRAVENOUS ONCE
Status: COMPLETED | OUTPATIENT
Start: 2024-07-26 | End: 2024-07-26

## 2024-07-26 RX ORDER — METOCLOPRAMIDE 10 MG/1
10 TABLET ORAL EVERY 6 HOURS
Qty: 30 TABLET | Refills: 0 | Status: SHIPPED | OUTPATIENT
Start: 2024-07-26

## 2024-07-26 RX ADMIN — DIPHENHYDRAMINE HYDROCHLORIDE 25 MG: 50 INJECTION, SOLUTION INTRAMUSCULAR; INTRAVENOUS at 15:41

## 2024-07-26 RX ADMIN — IOHEXOL 100 ML: 350 INJECTION, SOLUTION INTRAVENOUS at 16:30

## 2024-07-26 RX ADMIN — METOCLOPRAMIDE 10 MG: 5 INJECTION, SOLUTION INTRAMUSCULAR; INTRAVENOUS at 15:42

## 2024-07-26 RX ADMIN — KETOROLAC TROMETHAMINE 15 MG: 30 INJECTION, SOLUTION INTRAMUSCULAR; INTRAVENOUS at 15:40

## 2024-07-26 RX ADMIN — SODIUM CHLORIDE 1000 ML: 0.9 INJECTION, SOLUTION INTRAVENOUS at 15:39

## 2024-07-26 NOTE — ED PROVIDER NOTES
History  Chief Complaint   Patient presents with    Chest Pain     Came in for complaints of right sided chest pain started yesterday. Denies numbness, tingling nor weakness.     58 y/o female presents to the ED for chest pain since yesterday. States that it has been constant since onset. Unable to describe the pain. Denies any radiation. She states that movement and deep breath worsen the pain. No fall or injury. Has had nausea but denies any vomiting. She states that she had had abd pain, nausea, and decreased PO intake for a while for which her PCP ordered a ct scan today but she does not know the results yet. Denies any f/c, urinary symptoms, or d/c. No other complaints. Has not tried anything for the symtposm       History provided by:  Patient      Prior to Admission Medications   Prescriptions Last Dose Informant Patient Reported? Taking?   OLANZapine (ZyPREXA) 5 mg tablet   Yes No   Sig: Take 5 mg by mouth daily at bedtime   SUMAtriptan (IMITREX) 100 mg tablet   No No   Sig: Take 1 tablet by mouth twice daily as needed   albuterol (ProAir HFA) 90 mcg/act inhaler   Yes No   Sig: Inhale 1 puff every 6 (six) hours as needed   clonazePAM (KlonoPIN) 1 mg tablet   Yes No   Sig: Take 1 mg by mouth 2 (two) times a day   gabapentin (NEURONTIN) 300 mg capsule   No No   Sig: Take 1 capsule (300 mg total) by mouth 2 (two) times a day   ketorolac (TORADOL) 10 mg tablet   No No   Sig: Take 1 tablet (10 mg total) by mouth every 6 (six) hours as needed for moderate pain   lamoTRIgine (LaMICtal) 200 MG tablet   Yes No   Sig: Take 200 mg by mouth daily   lidocaine (LIDODERM) 5 %   Yes No   Sig: Apply 2 patches topically daily   meclizine (ANTIVERT) 25 mg tablet   No No   Sig: Take 1 tablet (25 mg total) by mouth every 8 (eight) hours   topiramate (TOPAMAX) 100 mg tablet   No No   Sig: TAKE ONE TABLET BY MOUTH ONCE DAILY AT BEDTIME      Facility-Administered Medications: None       Past Medical History:   Diagnosis Date     Anxiety     Asthma     Bipolar 1 disorder (HCC)     Depression     Psychiatric disorder        Past Surgical History:   Procedure Laterality Date     SECTION         No family history on file.  I have reviewed and agree with the history as documented.    E-Cigarette/Vaping    E-Cigarette Use Current Every Day User      E-Cigarette/Vaping Substances    Nicotine No     THC No     Flavoring Yes      Social History     Tobacco Use    Smoking status: Never    Smokeless tobacco: Never   Vaping Use    Vaping status: Every Day    Substances: Flavoring   Substance Use Topics    Alcohol use: Not Currently    Drug use: Not Currently       Review of Systems   Constitutional:  Negative for chills and fever.   HENT:  Negative for congestion, ear pain and sore throat.    Eyes:  Negative for pain and visual disturbance.   Respiratory:  Positive for shortness of breath. Negative for cough and wheezing.    Cardiovascular:  Positive for chest pain. Negative for leg swelling.   Gastrointestinal:  Positive for nausea. Negative for abdominal pain, diarrhea and vomiting.   Genitourinary:  Negative for dysuria, frequency, hematuria and urgency.   Musculoskeletal:  Negative for neck pain and neck stiffness.   Skin:  Negative for rash and wound.   Neurological:  Negative for weakness, numbness and headaches.   Psychiatric/Behavioral:  Negative for agitation and confusion.    All other systems reviewed and are negative.      Physical Exam  Physical Exam  Vitals and nursing note reviewed.   Constitutional:       Appearance: She is well-developed.   HENT:      Head: Normocephalic and atraumatic.   Eyes:      Pupils: Pupils are equal, round, and reactive to light.   Cardiovascular:      Rate and Rhythm: Normal rate and regular rhythm.   Pulmonary:      Effort: Pulmonary effort is normal.      Breath sounds: Normal breath sounds.   Chest:      Chest wall: Tenderness present.   Abdominal:      General: Bowel sounds are normal.       Palpations: Abdomen is soft.   Musculoskeletal:         General: Normal range of motion.      Cervical back: Normal range of motion and neck supple.   Skin:     General: Skin is warm and dry.   Neurological:      General: No focal deficit present.      Mental Status: She is alert and oriented to person, place, and time.      Comments: No focal deficits         Vital Signs  ED Triage Vitals   Temperature Pulse Respirations Blood Pressure SpO2   07/26/24 1453 07/26/24 1453 07/26/24 1453 07/26/24 1453 07/26/24 1453   97.6 °F (36.4 °C) 65 20 115/64 97 %      Temp Source Heart Rate Source Patient Position - Orthostatic VS BP Location FiO2 (%)   07/26/24 1453 07/26/24 1453 07/26/24 1453 07/26/24 1453 --   Temporal Monitor Sitting Left arm       Pain Score       07/26/24 1539       10 - Worst Possible Pain           Vitals:    07/26/24 1453 07/26/24 1748   BP: 115/64 146/74   Pulse: 65 62   Patient Position - Orthostatic VS: Sitting Lying         Visual Acuity      ED Medications  Medications   sodium chloride (PF) 0.9 % injection 3 mL (has no administration in time range)   metoclopramide (REGLAN) injection 10 mg (10 mg Intravenous Given 7/26/24 1542)   diphenhydrAMINE (BENADRYL) injection 25 mg (25 mg Intravenous Given 7/26/24 1541)   sodium chloride 0.9 % bolus 1,000 mL (0 mL Intravenous Stopped 7/26/24 1807)   ketorolac (TORADOL) injection 15 mg (15 mg Intravenous Given 7/26/24 1540)   iohexol (OMNIPAQUE) 350 MG/ML injection (MULTI-DOSE) 100 mL (100 mL Intravenous Given 7/26/24 1630)       Diagnostic Studies  Results Reviewed       Procedure Component Value Units Date/Time    HS Troponin I 2hr [[id number]]  (Normal) Collected: 07/26/24 1737    Lab Status: Final result Specimen: Blood from Arm, Left Updated: 07/26/24 1805     hs TnI 2hr 3 ng/L      Delta 2hr hsTnI >1 ng/L     FLU/RSV/COVID - if FLU/RSV clinically relevant [[id number]]  (Normal) Collected: 07/26/24 1535    Lab Status: Final result Specimen: Nares from Nose  Updated: 07/26/24 1638     SARS-CoV-2 Negative     INFLUENZA A PCR Negative     INFLUENZA B PCR Negative     RSV PCR Negative    Narrative:      FOR PEDIATRIC PATIENTS - copy/paste COVID Guidelines URL to browser: [email or web address]    SARS-CoV-2 assay is a Nucleic Acid Amplification assay intended for the  qualitative detection of nucleic acid from SARS-CoV-2 in nasopharyngeal  swabs. Results are for the presumptive identification of SARS-CoV-2 RNA.    Positive results are indicative of infection with SARS-CoV-2, the virus  causing COVID-19, but do not rule out bacterial infection or co-infection  with other viruses. Laboratories within the [place] and its  territories are required to report all positive results to the appropriate  public health authorities. Negative results do not preclude SARS-CoV-2  infection and should not be used as the sole basis for treatment or other  patient management decisions. Negative results must be combined with  clinical observations, patient history, and epidemiological information.  This test has not been FDA cleared or approved.    This test has been authorized by FDA under an Emergency Use Authorization  (EUA). This test is only authorized for the duration of time the  declaration that circumstances exist justifying the authorization of the  emergency use of an in vitro diagnostic tests for detection of SARS-CoV-2  virus and/or diagnosis of COVID-19 infection under section 564(b)(1) of  the Act, 21 U.S.C. 360bbb-3(b)(1), unless the authorization is terminated  or revoked sooner. The test has been validated but independent review by FDA  and CLIA is pending.    Test performed using [employer]: This RT-PCR assay targets N2,  a region unique to SARS-CoV-2. A conserved region in the E-gene was chosen  for pan-Sarbecovirus detection which includes SARS-CoV-2.    According to CMS-2020-01-R, this platform meets the  definition of high-throughput technology.    HS Troponin 0hr (reflex protocol) [[id number]]  (Normal) Collected: 07/26/24 1535    Lab Status: Final result Specimen: Blood from Arm, Right Updated: 07/26/24 1623     hs TnI 0hr <2 ng/L     HS Troponin I 4hr [[id number]]     Lab Status: No result Specimen: Blood     Comprehensive metabolic panel [[id number]]  (Abnormal) Collected: 07/26/24 1535    Lab Status: Final result Specimen: Blood from Arm, Right Updated: 07/26/24 1617     Sodium 143 mmol/L      Potassium 3.9 mmol/L      Chloride 106 mmol/L      CO2 30 mmol/L      ANION GAP 7 mmol/L      BUN 18 mg/dL      Creatinine 0.74 mg/dL      Glucose 108 mg/dL      Calcium 9.6 mg/dL      AST 26 U/L      ALT 28 U/L      Alkaline Phosphatase 114 U/L      Total Protein 7.6 g/dL      Albumin 4.3 g/dL      Total Bilirubin 0.59 mg/dL      eGFR 87 ml/min/1.73sq m     Narrative:      National Kidney Disease Foundation guidelines for Chronic Kidney Disease (CKD):     Stage 1 with normal or high GFR (GFR > 90 mL/min/1.73 square meters)    Stage 2 Mild CKD (GFR = 60-89 mL/min/1.73 square meters)    Stage 3A Moderate CKD (GFR = 45-59 mL/min/1.73 square meters)    Stage 3B Moderate CKD (GFR = 30-44 mL/min/1.73 square meters)    Stage 4 Severe CKD (GFR = 15-29 mL/min/1.73 square meters)    Stage 5 End Stage CKD (GFR <15 mL/min/1.73 square meters)  Note: GFR calculation is accurate only with a steady state creatinine    Lipase [[id number]]  (Normal) Collected: 07/26/24 1535    Lab Status: Final result Specimen: Blood from Arm, Right Updated: 07/26/24 1617     Lipase 15 u/L     D-dimer, quantitative [[id number]]  (Abnormal) Collected: 07/26/24 1535    Lab Status: Final result Specimen: Blood from Arm, Right Updated: 07/26/24 1616     D-Dimer, Quant 0.98 ug/ml FEU     Narrative:      In the evaluation for possible pulmonary embolism, in the appropriate (Well's Score of 4 or less) patient, the age adjusted d-dimer cutoff for this patient can be  calculated as:    Age x 0.01 (in ug/mL) for Age-adjusted D-dimer exclusion threshold for a patient over 50 years.    CBC and differential [[id number]]  (Abnormal) Collected: 07/26/24 1535    Lab Status: Final result Specimen: Blood from Arm, Right Updated: 07/26/24 1552     WBC 8.45 Thousand/uL      RBC 3.85 Million/uL      Hemoglobin 13.2 g/dL      Hematocrit 38.9 %       fL      MCH 34.3 pg      MCHC 33.9 g/dL      RDW 12.1 %      MPV 11.6 fL      Platelets 274 Thousands/uL      nRBC 0 /100 WBCs      Segmented % 61 %      Immature Grans % 1 %      Lymphocytes % 24 %      Monocytes % 11 %      Eosinophils Relative 2 %      Basophils Relative 1 %      Absolute Neutrophils 5.26 Thousands/µL      Absolute Immature Grans 0.05 Thousand/uL      Absolute Lymphocytes 2.04 Thousands/µL      Absolute Monocytes 0.92 Thousand/µL      Eosinophils Absolute 0.14 Thousand/µL      Basophils Absolute 0.04 Thousands/µL                    PE Study with CT abdomen & pelvis with contrast   Final Result by Jenaro Davison MD (07/26 1722)      No acute findings.                     Workstation performed: [id number]         XR chest 2 views   Final Result by Babita Shaw MD (07/26 1638)      No acute cardiopulmonary disease.               Workstation performed: [id number]                    Procedures  ECG 12 Lead Documentation Only    Date/Time: 7/26/2024 3:00 PM    Performed by: Vandana Luciano DO  Authorized by: Vandana Luciano DO    Indications / Diagnosis:  Chest pain  Patient location:  ED  Rate:     ECG rate:  65    ECG rate assessment: normal    Rhythm:     Rhythm: sinus rhythm    Ectopy:     Ectopy: none    QRS:     QRS axis:  Normal    QRS intervals:  Normal  Conduction:     Conduction: normal    ST segments:     ST segments:  Normal  T waves:     T waves: normal    ECG 12 Lead Documentation Only    Date/Time: 7/26/2024 5:57 PM    Performed by: Vandana Luciano DO  Authorized by: Vandana Luciano DO     Indications / Diagnosis:  Chest pain delta  Patient location:  ED  Rate:     ECG rate:  62    ECG rate assessment: normal    Rhythm:     Rhythm: sinus rhythm    Ectopy:     Ectopy: none    QRS:     QRS axis:  Normal    QRS intervals:  Normal  ST segments:     ST segments:  Normal  T waves:     T waves: normal             ED Course  ED Course as of 07/26/24 1820   Fri Jul 26, 2024   1512 Chest pain center of chest since yesterday. Worse when she took a shower yesterday. Unable to describe. Worse with movement and deep breath. Nausea. No vomiting. Decreased po intake and nausea. So had a ct scan done today.    1618 D-Dimer, Quant(!): 0.98               HEART Risk Score      Flowsheet Row Most Recent Value   Heart Score Risk Calculator    History 0 Filed at: 07/26/2024 1809   ECG 0 Filed at: 07/26/2024 1809   Age 1 Filed at: 07/26/2024 1809   Risk Factors 1 Filed at: 07/26/2024 1809   Troponin 0 Filed at: 07/26/2024 1809   HEART Score 2 Filed at: 07/26/2024 1809                          SBIRT 22yo+      Flowsheet Row Most Recent Value   Initial Alcohol Screen: US AUDIT-C     1. How often do you have a drink containing alcohol? 0 Filed at: 07/26/2024 1451   2. How many drinks containing alcohol do you have on a typical day you are drinking?  0 Filed at: 07/26/2024 1451   3a. Male UNDER 65: How often do you have five or more drinks on one occasion? 0 Filed at: 07/26/2024 1451   3b. FEMALE Any Age, or MALE 65+: How often do you have 4 or more drinks on one occassion? 0 Filed at: 07/26/2024 1451   Audit-C Score 0 Filed at: 07/26/2024 1451   LOIS: How many times in the past year have you...    Used an illegal drug or used a prescription medication for non-medical reasons? Never Filed at: 07/26/2024 1451                      Medical Decision Making  58 y/o female with chest pain- will get labs, ddimer, trop/EKG, and ct scan. Will give reglan/ benadryl, toradol, and reassess.     Amount and/or Complexity of Data  Reviewed  Labs: ordered. Decision-making details documented in ED Course.  Radiology: ordered.    Risk  Prescription drug management.                 Disposition  Final diagnoses:   Chest pain     Time reflects when diagnosis was documented in both MDM as applicable and the Disposition within this note       Time User Action Codes Description Comment    7/26/2024  6:10 PM Vandana Luciano Add [R07.9] Chest pain           ED Disposition       ED Disposition   Discharge    Condition   Stable    Date/Time   Fri Jul 26, 2024 1810    Comment   Gaby Lynn discharge to home/self care.                   Follow-up Information       Follow up With Specialties Details Why Contact Info Additional Information    Moises Schwarz MD Family Medicine Call in 1 day for follow up within 2-3 days [address]  [facility] [address]  [phone]       [facility] Emergency Medicine Go to  immediately for any new or worsening symptoms [address] [facility] [id number] [phone] [facility], [address] [place] [address], [id number]            Patient's Medications   Discharge Prescriptions    METOCLOPRAMIDE (REGLAN) 10 MG TABLET    Take 1 tablet (10 mg total) by mouth every 6 (six) hours       Start Date: 7/26/2024 End Date: --       Order Dose: 10 mg       Quantity: 30 tablet    Refills: 0    NAPROXEN (NAPROSYN) 500 MG TABLET    Take 1 tablet (500 mg total) by mouth 2 (two) times a day with meals       Start Date: 7/26/2024 End Date: --       Order Dose: 500 mg       Quantity: 30 tablet    Refills: 0       No discharge procedures on file.    PDMP Review       None            ED Provider  Electronically Signed by             Vandana Luciano DO  07/26/24 0274

## 2024-07-27 LAB
ATRIAL RATE: 62 BPM
ATRIAL RATE: 65 BPM
P AXIS: 53 DEGREES
P AXIS: 57 DEGREES
PR INTERVAL: 160 MS
PR INTERVAL: 170 MS
QRS AXIS: 49 DEGREES
QRS AXIS: 59 DEGREES
QRSD INTERVAL: 78 MS
QRSD INTERVAL: 80 MS
QT INTERVAL: 402 MS
QT INTERVAL: 436 MS
QTC INTERVAL: 418 MS
QTC INTERVAL: 442 MS
T WAVE AXIS: 49 DEGREES
T WAVE AXIS: 51 DEGREES
VENTRICULAR RATE: 62 BPM
VENTRICULAR RATE: 65 BPM

## 2024-07-27 PROCEDURE — 93010 ELECTROCARDIOGRAM REPORT: CPT | Performed by: INTERNAL MEDICINE
